# Patient Record
Sex: FEMALE | Race: WHITE | NOT HISPANIC OR LATINO | Employment: FULL TIME | URBAN - NONMETROPOLITAN AREA
[De-identification: names, ages, dates, MRNs, and addresses within clinical notes are randomized per-mention and may not be internally consistent; named-entity substitution may affect disease eponyms.]

---

## 2017-01-06 RX ORDER — NICOTINE 21 MG/24HR
1 PATCH, TRANSDERMAL 24 HOURS TRANSDERMAL EVERY 24 HOURS
Qty: 28 PATCH | Refills: 3 | Status: SHIPPED | OUTPATIENT
Start: 2017-01-06 | End: 2017-04-26

## 2017-02-20 ENCOUNTER — OFFICE VISIT (OUTPATIENT)
Dept: ORTHOPEDIC SURGERY | Facility: CLINIC | Age: 32
End: 2017-02-20

## 2017-02-20 VITALS — BODY MASS INDEX: 26.66 KG/M2 | HEIGHT: 65 IN | WEIGHT: 160 LBS

## 2017-02-20 DIAGNOSIS — M12.20 PVNS (PIGMENTED VILLONODULAR SYNOVITIS): ICD-10-CM

## 2017-02-20 DIAGNOSIS — M25.462 KNEE EFFUSION, LEFT: ICD-10-CM

## 2017-02-20 DIAGNOSIS — G89.29 CHRONIC PAIN OF LEFT KNEE: Primary | ICD-10-CM

## 2017-02-20 DIAGNOSIS — M25.562 CHRONIC PAIN OF LEFT KNEE: Primary | ICD-10-CM

## 2017-02-20 PROCEDURE — 99213 OFFICE O/P EST LOW 20 MIN: CPT | Performed by: ORTHOPAEDIC SURGERY

## 2017-02-20 NOTE — PROGRESS NOTES
Tiara Verde is a 31 y.o. female returns for left knee pain isn't had arthroscopic knee surgery in June of last 16 patient had a synovitis of the knee's some adhesions in the suprapatellar pouch large plica some synovitis which I diagnosis pigmented villonodular synovitis but was not by pathology but she said for the last months she's had pain and limited motion but no swelling or effusion physical examination shows a limited protective range of motion but no effusion no bogginess is synovium etc.     Chief Complaint   Patient presents with   • Left Knee - Follow-up       HISTORY OF PRESENT ILLNESS: Patient had a scope 6/7/16 and now has pain and swelling.        CONCURRENT MEDICAL HISTORY:    Past Medical History   Diagnosis Date   • Depressive disorder    • Joint pain      Pain in unspecified joint      • Knee pain    • Malaise      Other malaise   • Mastodynia    • Nicotine dependence    • Pelvic and perineal pain    • Thumb pain      Pain in thumb    • Tick bite    • Vaginal discharge        Allergies   Allergen Reactions   • Azithromycin          Current Outpatient Prescriptions:   •  escitalopram (LEXAPRO) 20 MG tablet, Take 1 tablet by mouth every night. anxiety/depression, Disp: 30 tablet, Rfl: 11  •  nicotine (EQL NICOTINE) 21 MG/24HR patch, Place 1 patch on the skin Daily., Disp: 28 patch, Rfl: 3  •  norgestimate-ethinyl estradiol (ORTHO TRI-CYCLEN, 28,) 0.18/0.215/0.25 MG-35 MCG per tablet, Take 1 tablet by mouth daily., Disp: 28 tablet, Rfl: 11  •  amoxicillin (AMOXIL) 500 MG capsule, Take 2 capsules by mouth 2 (Two) Times a Day., Disp: 40 capsule, Rfl: 0    Past Surgical History   Procedure Laterality Date   • Colonoscopy  08/30/2012     Colonoscopy, diagnostic (screening) 20133 (1)    • Procedure generic converted  09/15/2006     CONZ OF CERVIX W/SCOPE LEEP 21546 (1)      • Endoscopy  08/30/2012     EGD w/ tube 27181 (1)      • Knee arthroscopy  03/08/2016     Knee arthroscopy, surgery (1)     "Arthroscopy with removal of loose bodies and pathological plica.    • Knee surgery  06/07/2016     Debridement. Synovectomy. Plica excision. Insertion of drains.   • Pap smear  11/15/2010   • Procedure generic converted  10/03/2012     Salpingo-oophorectomy (1)      • Tubal abdominal ligation  10/27/2010   • Vaginal delivery  10/2010       ROS  No fevers or chills.  No chest pain or shortness of air.  No GI or  disturbances.    PHYSICAL EXAMINATION:       Visit Vitals   • Ht 65\" (165.1 cm)   • Wt 160 lb (72.6 kg)   • BMI 26.63 kg/m2       Physical Exam    GAIT:     []  Normal  []  Antalgic    Assistive device: []  None  []  Walker     []  Crutches  []  Cane     []  Wheelchair  []  Stretcher    Ortho Exam the upper note she has limited range of motion she has passive range of motion is full but she is protective as far as pain is concerned  No results found.          ASSESSMENT: Repeat synovitis pigmented villonodular    Diagnoses and all orders for this visit:    Chronic pain of left knee    Knee effusion, left          PLAN MRI of the knee No Follow-up on file.    Ilan Klein MD  "

## 2017-02-27 ENCOUNTER — HOSPITAL ENCOUNTER (OUTPATIENT)
Dept: MRI IMAGING | Facility: HOSPITAL | Age: 32
Discharge: HOME | End: 2017-02-27
Admitting: ORTHOPAEDIC SURGERY

## 2017-02-27 DIAGNOSIS — M25.562 CHRONIC PAIN OF LEFT KNEE: ICD-10-CM

## 2017-02-27 DIAGNOSIS — M12.20 PVNS (PIGMENTED VILLONODULAR SYNOVITIS): ICD-10-CM

## 2017-02-27 DIAGNOSIS — G89.29 CHRONIC PAIN OF LEFT KNEE: ICD-10-CM

## 2017-02-27 PROCEDURE — 73721 MRI JNT OF LWR EXTRE W/O DYE: CPT

## 2017-03-01 ENCOUNTER — OFFICE VISIT (OUTPATIENT)
Dept: ORTHOPEDIC SURGERY | Facility: CLINIC | Age: 32
End: 2017-03-01

## 2017-03-01 VITALS — HEIGHT: 65 IN | BODY MASS INDEX: 28.16 KG/M2 | WEIGHT: 169 LBS

## 2017-03-01 DIAGNOSIS — G89.29 CHRONIC PAIN OF LEFT KNEE: ICD-10-CM

## 2017-03-01 DIAGNOSIS — M22.42 CHONDROMALACIA OF PATELLA, LEFT: ICD-10-CM

## 2017-03-01 DIAGNOSIS — M25.462 KNEE EFFUSION, LEFT: Primary | ICD-10-CM

## 2017-03-01 DIAGNOSIS — M25.562 CHRONIC PAIN OF LEFT KNEE: ICD-10-CM

## 2017-03-01 DIAGNOSIS — M12.20 PVNS (PIGMENTED VILLONODULAR SYNOVITIS): ICD-10-CM

## 2017-03-01 PROBLEM — M22.40 CHONDROMALACIA OF PATELLA: Status: ACTIVE | Noted: 2017-03-01

## 2017-03-01 PROCEDURE — 20610 DRAIN/INJ JOINT/BURSA W/O US: CPT | Performed by: ORTHOPAEDIC SURGERY

## 2017-03-01 PROCEDURE — 99212 OFFICE O/P EST SF 10 MIN: CPT | Performed by: ORTHOPAEDIC SURGERY

## 2017-03-01 RX ORDER — TRIAMCINOLONE ACETONIDE 40 MG/ML
80 INJECTION, SUSPENSION INTRA-ARTICULAR; INTRAMUSCULAR
Status: COMPLETED | OUTPATIENT
Start: 2017-03-01 | End: 2017-03-01

## 2017-03-01 RX ADMIN — TRIAMCINOLONE ACETONIDE 80 MG: 40 INJECTION, SUSPENSION INTRA-ARTICULAR; INTRAMUSCULAR at 08:40

## 2017-03-01 NOTE — PROGRESS NOTES
Tiara Verde is a 31 y.o. female returns for MRI results     Chief Complaint   Patient presents with   • Left Knee - Follow-up     Here today for mri results       HISTORY OF PRESENT ILLNESS:       CONCURRENT MEDICAL HISTORY:    Past Medical History   Diagnosis Date   • Depressive disorder    • Joint pain      Pain in unspecified joint      • Knee pain    • Malaise      Other malaise   • Mastodynia    • Nicotine dependence    • Pelvic and perineal pain    • Thumb pain      Pain in thumb    • Tick bite    • Vaginal discharge        Allergies   Allergen Reactions   • Azithromycin          Current Outpatient Prescriptions:   •  amoxicillin (AMOXIL) 500 MG capsule, Take 2 capsules by mouth 2 (Two) Times a Day., Disp: 40 capsule, Rfl: 0  •  escitalopram (LEXAPRO) 20 MG tablet, Take 1 tablet by mouth every night. anxiety/depression, Disp: 30 tablet, Rfl: 11  •  nicotine (EQL NICOTINE) 21 MG/24HR patch, Place 1 patch on the skin Daily., Disp: 28 patch, Rfl: 3  •  norgestimate-ethinyl estradiol (ORTHO TRI-CYCLEN, 28,) 0.18/0.215/0.25 MG-35 MCG per tablet, Take 1 tablet by mouth daily., Disp: 28 tablet, Rfl: 11    Past Surgical History   Procedure Laterality Date   • Colonoscopy  08/30/2012     Colonoscopy, diagnostic (screening) 82060 (1)    • Procedure generic converted  09/15/2006     CONZ OF CERVIX W/SCOPE LEEP 55785 (1)      • Endoscopy  08/30/2012     EGD w/ tube 59190 (1)      • Knee arthroscopy  03/08/2016     Knee arthroscopy, surgery (1)    Arthroscopy with removal of loose bodies and pathological plica.    • Knee surgery  06/07/2016     Debridement. Synovectomy. Plica excision. Insertion of drains.   • Pap smear  11/15/2010   • Procedure generic converted  10/03/2012     Salpingo-oophorectomy (1)      • Tubal abdominal ligation  10/27/2010   • Vaginal delivery  10/2010       ROS  No fevers or chills.  No chest pain or shortness of air.  No GI or  disturbances.    PHYSICAL EXAMINATION:       Visit  "Vitals   • Ht 65\" (165.1 cm)   • Wt 169 lb (76.7 kg)   • BMI 28.12 kg/m2       Physical Exam    GAIT:     []  Normal  []  Antalgic    Assistive device: []  None  []  Walker     []  Crutches  []  Cane     []  Wheelchair  []  Stretcher    Ortho Exam      Mri Knee Left Without Contrast    Result Date: 2/27/2017  Narrative: Patient Name:  MRS. VINICIO JAMIL Patient ID:  [id number] Ordering:  JV TATUM Referring:  JV TATUM ------------------------------------------------ DATE OF EXAM: 2/27/2017 12:41 PM CST PROCEDURE: MR KNEE WITHOUT IV CONTRAST INDICATION FOR PROCEDURE: 31 years old patient presents for evaluation of chronic knee pain and PVNS. ICD 10 codes:  M25.562, G89.29 and M12.20. TECHNIQUE:  Multiplanar multisequence MR images of the left knee are submitted for interpretation. COMPARISON:  None. FINDINGS:  The retropatellar cartilage has decreased thickness. Medial and lateral retinacula have a grossly normal appearance. There is a small amount joint fluid. The quadriceps and patellar tendons have normal curvilinear signal void. Anterior and posterior cruciate ligaments have a grossly normal appearance. Lateral meniscus has normal morphology and signal characteristics. There are grade 2 signal changes within the medial meniscus without definite tear. The iliotibial band, biceps femoris tendon, medial and lateral collateral ligaments have a normal MR appearance. Imaged distal femur, proximal tibia and fibula have a normal appearance and alignment. Visualized skeletal muscles have a grossly normal appearance.     Impression: 1.  Mild chondromalacia patella. 2.  Mild chondromalacia of the medial meniscus. 3.  Small joint effusion. Electronically signed by:  Winifred Blue MD  2/27/2017 5:39 PM CST Workstation: [facility]      Large Joint Arthrocentesis  Date/Time: 3/1/2017 8:40 AM  Consent given by: patient  Site marked: site marked  Timeout: Immediately prior to procedure a time out was called " to verify the correct patient, procedure, equipment, support staff and site/side marked as required   Supporting Documentation  Indications: pain   Procedure Details  Location: knee -   Preparation: Patient was prepped and draped in the usual sterile fashion  Needle size: 22 G  Medications administered: 80 mg triamcinolone acetonide 40 MG/ML  Patient tolerance: patient tolerated the procedure well with no immediate complications       MRI results were given to the patient she no significant pathology that is going to require surgery there are degenerative changes as expected intercourse the meniscus changes from the previous surgery does have chondromalacia patella is no gross synovitis today or effusion W or cortisone shot and see how she does and discussion the treatment of this problem her diagnosis is chondromalacia patella.03/01/17 at 8:56 AM by Ilan Klein MD          ASSESSMENT:    Diagnoses and all orders for this visit:    Knee effusion, left  -     Large Joint Arthrocentesis    Chronic pain of left knee  -     Large Joint Arthrocentesis    PVNS (pigmented villonodular synovitis)  -     Large Joint Arthrocentesis        PLAN    No Follow-up on file.    Ilan Klein MD

## 2017-04-26 ENCOUNTER — OFFICE VISIT (OUTPATIENT)
Dept: ORTHOPEDIC SURGERY | Facility: CLINIC | Age: 32
End: 2017-04-26

## 2017-04-26 VITALS — BODY MASS INDEX: 28.32 KG/M2 | HEIGHT: 65 IN | WEIGHT: 170 LBS

## 2017-04-26 DIAGNOSIS — M65.9 SYNOVITIS OF ANKLE: ICD-10-CM

## 2017-04-26 DIAGNOSIS — M25.571 ACUTE RIGHT ANKLE PAIN: Primary | ICD-10-CM

## 2017-04-26 PROCEDURE — 99213 OFFICE O/P EST LOW 20 MIN: CPT | Performed by: ORTHOPAEDIC SURGERY

## 2017-04-26 NOTE — PROGRESS NOTES
Tiara Verde is a 31 y.o. female pain in the right ankle  Primary provider:  CHRIS Temple       Chief Complaint   Patient presents with   • Right Ankle - Pain     Xray done today in the office       HISTORY OF PRESENT ILLNESS:    Pain   This is a new problem. Episode onset: 3 weeks ago, jsut began with no injury. The problem occurs constantly. The problem has been gradually worsening. Associated symptoms include joint swelling. Associated symptoms comments: Sharp pain, giving, weakness, swelling. The symptoms are aggravated by walking and standing. She has tried nothing for the symptoms.      She states that by the end of the day the ankles are markedly swollen  CONCURRENT MEDICAL HISTORY:    Past Medical History:   Diagnosis Date   • Depressive disorder    • Joint pain     Pain in unspecified joint      • Knee pain    • Malaise     Other malaise   • Mastodynia    • Nicotine dependence    • Pelvic and perineal pain    • Thumb pain     Pain in thumb    • Tick bite    • Vaginal discharge        Allergies   Allergen Reactions   • Azithromycin        No current outpatient prescriptions on file.    Past Surgical History:   Procedure Laterality Date   • COLONOSCOPY  08/30/2012    Colonoscopy, diagnostic (screening) 43469 (1)    • ENDOSCOPY  08/30/2012    EGD w/ tube 59974 (1)      • KNEE ARTHROSCOPY  03/08/2016    Knee arthroscopy, surgery (1)    Arthroscopy with removal of loose bodies and pathological plica.    • KNEE SURGERY  06/07/2016    Debridement. Synovectomy. Plica excision. Insertion of drains.   • PAP SMEAR  11/15/2010   • PROCEDURE GENERIC CONVERTED  09/15/2006    CONZ OF CERVIX W/SCOPE LEEP 50846 (1)      • PROCEDURE GENERIC CONVERTED  10/03/2012    Salpingo-oophorectomy (1)      • TUBAL ABDOMINAL LIGATION  10/27/2010   • VAGINAL DELIVERY  10/2010       Family History   Problem Relation Age of Onset   • Other Mother    • Other Sister    • Hyperlipidemia Other    • Hypertension Other    •  "Diabetes Other        Social History     Social History   • Marital status:      Spouse name: N/A   • Number of children: N/A   • Years of education: N/A     Occupational History   • Not on file.     Social History Main Topics   • Smoking status: Current Every Day Smoker   • Smokeless tobacco: Not on file   • Alcohol use No   • Drug use: No   • Sexual activity: Yes     Partners: Male     Birth control/ protection: Other     Other Topics Concern   • Not on file     Social History Narrative        Review of Systems   Constitutional: Negative.    HENT: Negative.    Eyes: Negative.    Respiratory: Negative.    Cardiovascular: Negative.    Gastrointestinal: Negative.    Endocrine: Negative.    Genitourinary: Negative.    Musculoskeletal: Positive for joint swelling.   Skin: Negative.    Allergic/Immunologic: Negative.    Neurological: Negative.    Hematological: Negative.    Psychiatric/Behavioral: Negative.        PHYSICAL EXAMINATION:       Ht 65\" (165.1 cm)  Wt 170 lb (77.1 kg)  BMI 28.29 kg/m2    Physical Exam    GAIT:     []  Normal  [x]  Antalgic    Assistive device: [x]  None  []  Walker     []  Crutches  []  Cane     []  Wheelchair  []  Stretcher    Ortho Exam examination of both feet and ankles shows slight increase and bimalleolar circumference on the right side markedly tender over the anterior aspect of the ankle at the fibular side and generalized tenderness along the ankle area did not detect any significant fluid within the ankle joint is a full range of motion but to her is markedly painful just in fact he can't even touch the fibular anterior fibular ankle region because of just complaints of pain is there is no increased heat or warmth neuro circulatory status is normal x-rays are normal plan is an MRI I think this individual is going to come with an acute synovitis problem I think she is going to come in the future demonstrates some type of arthritides          No results " found.        ASSESSMENT:    Diagnoses and all orders for this visit:    Acute right ankle pain          PLAN    No Follow-up on file.    Ilan Klein MD

## 2017-09-20 ENCOUNTER — APPOINTMENT (OUTPATIENT)
Dept: LAB | Facility: HOSPITAL | Age: 32
End: 2017-09-20

## 2017-09-20 ENCOUNTER — OFFICE VISIT (OUTPATIENT)
Dept: ORTHOPEDIC SURGERY | Facility: CLINIC | Age: 32
End: 2017-09-20

## 2017-09-20 ENCOUNTER — OFFICE VISIT (OUTPATIENT)
Dept: FAMILY MEDICINE CLINIC | Facility: CLINIC | Age: 32
End: 2017-09-20

## 2017-09-20 VITALS — BODY MASS INDEX: 27.99 KG/M2 | WEIGHT: 168 LBS | HEIGHT: 65 IN

## 2017-09-20 VITALS
BODY MASS INDEX: 28.16 KG/M2 | HEIGHT: 65 IN | DIASTOLIC BLOOD PRESSURE: 80 MMHG | SYSTOLIC BLOOD PRESSURE: 120 MMHG | WEIGHT: 169 LBS

## 2017-09-20 DIAGNOSIS — Z12.4 SCREENING FOR CERVICAL CANCER: Primary | ICD-10-CM

## 2017-09-20 DIAGNOSIS — F41.9 ANXIETY: ICD-10-CM

## 2017-09-20 DIAGNOSIS — M25.562 CHRONIC PAIN OF LEFT KNEE: Primary | ICD-10-CM

## 2017-09-20 DIAGNOSIS — G89.29 CHRONIC PAIN OF LEFT KNEE: Primary | ICD-10-CM

## 2017-09-20 DIAGNOSIS — M22.42 CHONDROMALACIA OF PATELLA, LEFT: ICD-10-CM

## 2017-09-20 DIAGNOSIS — Z00.00 GENERAL MEDICAL EXAM: ICD-10-CM

## 2017-09-20 DIAGNOSIS — F32.A DEPRESSIVE DISORDER: ICD-10-CM

## 2017-09-20 DIAGNOSIS — N76.0 ACUTE VAGINITIS: ICD-10-CM

## 2017-09-20 DIAGNOSIS — R53.81 MALAISE: ICD-10-CM

## 2017-09-20 LAB
CANDIDA ALBICANS: NEGATIVE
GARDNERELLA VAGINALIS: POSITIVE
TRICHOMONAS VAGINALIS PCR: NEGATIVE

## 2017-09-20 PROCEDURE — 99213 OFFICE O/P EST LOW 20 MIN: CPT | Performed by: ORTHOPAEDIC SURGERY

## 2017-09-20 PROCEDURE — 99214 OFFICE O/P EST MOD 30 MIN: CPT | Performed by: NURSE PRACTITIONER

## 2017-09-20 PROCEDURE — 87510 GARDNER VAG DNA DIR PROBE: CPT | Performed by: NURSE PRACTITIONER

## 2017-09-20 PROCEDURE — 88142 CYTOPATH C/V THIN LAYER: CPT | Performed by: NURSE PRACTITIONER

## 2017-09-20 PROCEDURE — 87480 CANDIDA DNA DIR PROBE: CPT | Performed by: NURSE PRACTITIONER

## 2017-09-20 PROCEDURE — 87660 TRICHOMONAS VAGIN DIR PROBE: CPT | Performed by: NURSE PRACTITIONER

## 2017-09-20 RX ORDER — ESCITALOPRAM OXALATE 20 MG/1
TABLET ORAL
COMMUNITY
Start: 2017-09-11 | End: 2017-09-20

## 2017-09-20 RX ORDER — METRONIDAZOLE 500 MG/1
500 TABLET ORAL 2 TIMES DAILY
Qty: 14 TABLET | Refills: 0 | Status: SHIPPED | OUTPATIENT
Start: 2017-09-20 | End: 2018-03-05

## 2017-09-20 RX ORDER — DESVENLAFAXINE SUCCINATE 50 MG/1
50 TABLET, EXTENDED RELEASE ORAL DAILY
Qty: 30 TABLET | Refills: 11 | Status: ON HOLD | OUTPATIENT
Start: 2017-09-20 | End: 2018-03-23

## 2017-09-20 NOTE — PROGRESS NOTES
Chief Complaint   Patient presents with   • Depression     med refill Pap?     Subjective   Tiara Verde is a 32 y.o. female.     HPI Comments: Patient presents with increase in anxiety and fatigue-has tried prozac, wellbutrin, lexapro and buspar-all made her feel more anxious and overwhelmed     Depression   Visit Type: follow-up  Patient presents with the following symptoms: decreased concentration, depressed mood, irritability, malaise and nervousness/anxiety.  Patient is not experiencing: anhedonia, chest pain, choking sensation, compulsions, confusion, dizziness, dry mouth, excessive worry, fatigue, memory impairment, nausea, obsessions, palpitations, shortness of breath, suicidal ideas, weight gain and weight loss.  Frequency of symptoms: most days   Severity: severe   Nighttime awakenings: none  Compliance with medications:  %        Anxiety   Presents for follow-up visit. Symptoms include decreased concentration, depressed mood, irritability, malaise and nervous/anxious behavior. Patient reports no chest pain, compulsions, confusion, dizziness, dry mouth, excessive worry, feeling of choking, nausea, obsessions, palpitations, shortness of breath or suicidal ideas. Symptoms occur occasionally (meds helps ). The severity of symptoms is mild. The quality of sleep is fair. Nighttime awakenings: none.     Her past medical history is significant for depression. Compliance with medications is %. Side effects of treatment include joint pain and limb pain (pos ra factor seeing rheumatology next month ).   Vaginitis   This is a recurrent problem. The current episode started 1 to 4 weeks ago. The problem occurs constantly. The problem has been gradually worsening. Associated symptoms include arthralgias, joint swelling and myalgias. Pertinent negatives include no abdominal pain, anorexia, chest pain, chills, coughing, diaphoresis, fatigue, fever, headaches, nausea, neck pain, numbness, rash, sore  throat, swollen glands, urinary symptoms, vertigo, visual change, vomiting or weakness. Nothing aggravates the symptoms.        The following portions of the patient's history were reviewed and updated as appropriate: allergies, current medications, past social history and problem list.    Review of Systems   Constitutional: Positive for irritability. Negative for activity change, appetite change, chills, diaphoresis, fatigue, fever, unexpected weight change, weight gain and weight loss.   HENT: Negative.  Negative for sore throat.    Eyes: Negative.  Negative for photophobia, pain, discharge, redness, itching and visual disturbance.   Respiratory: Negative.  Negative for apnea, cough, choking, chest tightness, shortness of breath, wheezing and stridor.    Cardiovascular: Negative.  Negative for chest pain, palpitations and leg swelling.   Gastrointestinal: Negative.  Negative for abdominal distention, abdominal pain, anorexia, nausea and vomiting.   Endocrine: Negative.  Negative for cold intolerance, heat intolerance, polydipsia, polyphagia and polyuria.   Genitourinary: Positive for vaginal discharge. Negative for decreased urine volume, difficulty urinating, dyspareunia, dysuria, enuresis, flank pain, frequency, genital sores, hematuria, menstrual problem, pelvic pain, urgency, vaginal bleeding and vaginal pain.   Musculoskeletal: Positive for arthralgias, back pain, gait problem, joint swelling and myalgias. Negative for neck pain and neck stiffness.   Skin: Negative.  Negative for color change and rash.   Allergic/Immunologic: Negative.  Negative for environmental allergies, food allergies and immunocompromised state.   Neurological: Negative for dizziness, vertigo, tremors, seizures, syncope, facial asymmetry, speech difficulty, weakness, light-headedness, numbness and headaches.   Hematological: Negative.  Negative for adenopathy. Does not bruise/bleed easily.   Psychiatric/Behavioral: Positive for decreased  "concentration and sleep disturbance. Negative for agitation, behavioral problems, confusion, dysphoric mood, hallucinations and suicidal ideas. The patient is nervous/anxious. The patient is not hyperactive.    All other systems reviewed and are negative.      Objective   /80  Ht 65\" (165.1 cm)  Wt 169 lb (76.7 kg)  BMI 28.12 kg/m2  Physical Exam   Constitutional: She is oriented to person, place, and time. She appears well-developed and well-nourished.   HENT:   Head: Normocephalic and atraumatic.   Mouth/Throat: No oropharyngeal exudate.   Eyes: EOM are normal. Pupils are equal, round, and reactive to light. Right eye exhibits no discharge. Left eye exhibits no discharge. No scleral icterus.   Neck: Normal range of motion. Neck supple. No JVD present. No tracheal deviation present. No thyromegaly present.   Cardiovascular: Normal rate, regular rhythm, normal heart sounds and normal pulses.  Exam reveals no gallop and no friction rub.    No murmur heard.  Pulmonary/Chest: Effort normal and breath sounds normal. No stridor. No respiratory distress. She has no wheezes. She has no rales. She exhibits no tenderness.   Abdominal: Soft. Bowel sounds are normal. She exhibits no distension and no mass. There is no tenderness. There is no rebound and no guarding. No hernia. Hernia confirmed negative in the right inguinal area and confirmed negative in the left inguinal area.   Genitourinary: Rectum normal. Rectal exam shows guaiac negative stool. Pelvic exam was performed with patient supine. No labial fusion. There is no rash, tenderness, lesion or injury on the right labia. There is no rash, tenderness, lesion or injury on the left labia. Uterus is not deviated, not enlarged, not fixed and not tender. Cervix exhibits no motion tenderness, no discharge and no friability. Right adnexum displays no mass, no tenderness and no fullness. Left adnexum displays no mass, no tenderness and no fullness. No erythema, " tenderness or bleeding in the vagina. No foreign body in the vagina. No signs of injury around the vagina. Vaginal discharge found.   Musculoskeletal: Normal range of motion. She exhibits no edema, tenderness or deformity.        Right shoulder: She exhibits swelling and pain. She exhibits no spasm.        Right knee: She exhibits bony tenderness.        Left knee: She exhibits bony tenderness.   Lymphadenopathy:     She has no cervical adenopathy.        Right: No inguinal adenopathy present.        Left: No inguinal adenopathy present.   Neurological: She is alert and oriented to person, place, and time. She has normal reflexes. She displays normal reflexes. No cranial nerve deficit. She exhibits normal muscle tone. Coordination normal.   Skin: Skin is warm and dry. No rash noted. No erythema. No pallor.   Psychiatric: She has a normal mood and affect.   Nursing note and vitals reviewed.      Assessment/Plan   Problem List Items Addressed This Visit        Genitourinary    Acute vaginitis    Relevant Orders    Vaginitis / Vaginosis DNA Probe       Other    Depressive disorder    Relevant Medications    desvenlafaxine (PRISTIQ) 50 MG 24 hr tablet    Screening for cervical cancer - Primary    Relevant Orders    Cytology, thin prep pap (cervical)    General medical exam    Relevant Orders    CBC & Differential    Comprehensive Metabolic Panel    Iron    Lipid Panel    Vitamin B12    Vitamin D 25 Hydroxy    TSH    T3    T4, Free    Magnesium    Malaise    Relevant Orders    CBC & Differential    Comprehensive Metabolic Panel    Iron    Lipid Panel    Vitamin B12    Vitamin D 25 Hydroxy    TSH    T3    T4, Free    Magnesium    Anxiety           New Medications Ordered This Visit   Medications   • desvenlafaxine (PRISTIQ) 50 MG 24 hr tablet     Sig: Take 1 tablet by mouth Daily.     Dispense:  30 tablet     Refill:  11        patient has tried has failed on several meds-lexapro, prozac, wellbutrin, buspar-will trial on  pristiq     It's not just what you eat, but when you eat  Eat breakfast, and eat smaller meals throughout the day. A healthy breakfast can jumpstart your metabolism, while eating small, healthy meals (rather than the standard three large meals) keeps your energy up.   Avoid eating at night. Try to eat dinner earlier and fast for 14-16 hours until breakfast the next morning. Studies suggest that eating only when you’re most active and giving your digestive system a long break each day may help to regulate weight.

## 2017-09-20 NOTE — PROGRESS NOTES
"Tiara Verde is a 32 y.o. female returns for     Chief Complaint   Patient presents with   • Left Knee - Follow-up       HISTORY OF PRESENT ILLNESS: steroid injection done on 3/1/2017 lasted about 2 days. Patient has a constant ache. Pain in the left knee patient's had both knees scoped.  The left one was the last instance done well than her ankle started to bother her then it just got better on its own we had ordered an MRI but that was never done.  The left knee now is bothering her since she can hardly bend it cannot walk on it she's limping   CONCURRENT MEDICAL HISTORY:    Past Medical History:   Diagnosis Date   • Depressive disorder    • Joint pain     Pain in unspecified joint      • Knee pain    • Malaise     Other malaise   • Mastodynia    • Nicotine dependence    • Pelvic and perineal pain    • Thumb pain     Pain in thumb    • Tick bite    • Vaginal discharge        Allergies   Allergen Reactions   • Azithromycin          Current Outpatient Prescriptions:   •  escitalopram (LEXAPRO) 20 MG tablet, , Disp: , Rfl:     Past Surgical History:   Procedure Laterality Date   • COLONOSCOPY  08/30/2012    Colonoscopy, diagnostic (screening) 44430 (1)    • ENDOSCOPY  08/30/2012    EGD w/ tube 52377 (1)      • KNEE ARTHROSCOPY  03/08/2016    Knee arthroscopy, surgery (1)    Arthroscopy with removal of loose bodies and pathological plica.    • KNEE SURGERY  06/07/2016    Debridement. Synovectomy. Plica excision. Insertion of drains.   • PAP SMEAR  11/15/2010   • PROCEDURE GENERIC CONVERTED  09/15/2006    CONZ OF CERVIX W/SCOPE LEEP 48330 (1)      • PROCEDURE GENERIC CONVERTED  10/03/2012    Salpingo-oophorectomy (1)      • TUBAL ABDOMINAL LIGATION  10/27/2010   • VAGINAL DELIVERY  10/2010       ROS  No fevers or chills.  No chest pain or shortness of air.  No GI or  disturbances.    PHYSICAL EXAMINATION:       Ht 65\" (165.1 cm)  Wt 168 lb (76.2 kg)  BMI 27.96 kg/m2    Physical Exam    GAIT:     []  " Normal  [x]  Antalgic    Assistive device: [x]  None  []  Walker     []  Crutches  []  Cane     []  Wheelchair  []  Stretcher    Ortho Exam physical exam of the left knee limited range of motion actively no crepitation noted no effusion is not hot or warm generalized tenderness and then the knee not specific impression is chondromalacia  No results found.          ASSESSMENT:    Diagnoses and all orders for this visit:    Chronic pain of left knee    Chondromalacia of patella, left    Other orders  -     escitalopram (LEXAPRO) 20 MG tablet;           PLAN    No Follow-up on file.    Ilan Klein MD

## 2017-09-22 ENCOUNTER — TELEPHONE (OUTPATIENT)
Dept: FAMILY MEDICINE CLINIC | Facility: CLINIC | Age: 32
End: 2017-09-22

## 2017-09-22 LAB
LAB AP CASE REPORT: NORMAL
LAB AP GYN ADDITIONAL INFORMATION: NORMAL
LAB AP GYN OTHER FINDINGS: NORMAL
Lab: NORMAL
PATH INTERP SPEC-IMP: NORMAL
STAT OF ADQ CVX/VAG CYTO-IMP: NORMAL

## 2017-10-16 RX ORDER — NAPROXEN 375 MG/1
375 TABLET ORAL 2 TIMES DAILY PRN
Qty: 60 TABLET | Refills: 5 | Status: SHIPPED | OUTPATIENT
Start: 2017-10-16 | End: 2019-04-22

## 2017-10-16 RX ORDER — CETIRIZINE HYDROCHLORIDE 10 MG/1
10 TABLET ORAL DAILY
Qty: 30 TABLET | Refills: 5 | Status: SHIPPED | OUTPATIENT
Start: 2017-10-16 | End: 2018-04-20 | Stop reason: SDUPTHER

## 2017-10-25 ENCOUNTER — CLINICAL SUPPORT (OUTPATIENT)
Dept: ORTHOPEDIC SURGERY | Facility: CLINIC | Age: 32
End: 2017-10-25

## 2017-10-25 VITALS — BODY MASS INDEX: 27.99 KG/M2 | WEIGHT: 168 LBS | HEIGHT: 65 IN

## 2017-10-25 DIAGNOSIS — M25.562 CHRONIC PAIN OF LEFT KNEE: Primary | ICD-10-CM

## 2017-10-25 DIAGNOSIS — G89.29 CHRONIC PAIN OF LEFT KNEE: Primary | ICD-10-CM

## 2017-10-25 PROCEDURE — 20610 DRAIN/INJ JOINT/BURSA W/O US: CPT | Performed by: ORTHOPAEDIC SURGERY

## 2017-10-25 NOTE — PROGRESS NOTES
"Knee Joint Injection      Patient: Tiara Verde    YOB: 1985    Chief Complaint   Patient presents with   • Left Knee - Follow-up   • Injections     Orthovisc #1       History of Present Illness:  Patient is here for an injection.  No new complaints.    Physical Exam: 32 y.o. female  General Appearance:    Alert, cooperative, in no acute distress                   Vitals:    10/25/17 0822   Weight: 168 lb (76.2 kg)   Height: 65\" (165.1 cm)        Patient is alert and oriented, ×3 no acute distress.  Affect is normal respiratory rate is normal unlabored.  Exam and complaints are unchanged.    Procedure:  Large Joint Arthrocentesis  Date/Time: 10/25/2017 8:22 AM  Consent given by: patient  Site marked: site marked  Timeout: Immediately prior to procedure a time out was called to verify the correct patient, procedure, equipment, support staff and site/side marked as required   Supporting Documentation  Indications: pain   Procedure Details  Location: knee - L knee  Needle size: 22 G  Approach: lateral  Medications administered: 30 mg Hyaluronan 30 MG/2ML  Patient tolerance: patient tolerated the procedure well with no immediate complications      No fluid obtained    Assessment:    Diagnoses and all orders for this visit:    Chronic pain of left knee  -     Large Joint Arthrocentesis        Plan:   Slowly increase activity as tolerated.  Discussed importance of leg strengthening and general conditioning.  Discussed warning signs of injection.  Discussed that purpose of injections was symptom improvement and improved activity.  Also discussed that further treatment options depended on symptoms at followup and length of time of improvement after injections.    No Follow-up on file.    Ilan Klein MD        "

## 2017-11-01 ENCOUNTER — CLINICAL SUPPORT (OUTPATIENT)
Dept: ORTHOPEDIC SURGERY | Facility: CLINIC | Age: 32
End: 2017-11-01

## 2017-11-01 VITALS — HEIGHT: 65 IN

## 2017-11-01 DIAGNOSIS — M25.562 CHRONIC PAIN OF LEFT KNEE: Primary | ICD-10-CM

## 2017-11-01 DIAGNOSIS — G89.29 CHRONIC PAIN OF LEFT KNEE: Primary | ICD-10-CM

## 2017-11-01 DIAGNOSIS — M22.42 CHONDROMALACIA OF PATELLA, LEFT: ICD-10-CM

## 2017-11-01 PROCEDURE — 20610 DRAIN/INJ JOINT/BURSA W/O US: CPT | Performed by: ORTHOPAEDIC SURGERY

## 2017-11-01 NOTE — PROGRESS NOTES
"Knee Joint Injection      Patient: Tiara Verde    YOB: 1985    Chief Complaint   Patient presents with   • Left Knee - Follow-up   • Injections     orthovisc injection #2       History of Present Illness:  Patient is here for an injection.  No new complaints.    Physical Exam: 32 y.o. female  General Appearance:    Alert, cooperative, in no acute distress                   Vitals:    11/01/17 0806   Height: 65\" (165.1 cm)        Patient is alert and oriented, ×3 no acute distress.  Affect is normal respiratory rate is normal unlabored.  Exam and complaints are unchanged.    Procedure:  Large Joint Arthrocentesis  Date/Time: 11/1/2017 8:06 AM  Consent given by: patient  Site marked: site marked  Timeout: Immediately prior to procedure a time out was called to verify the correct patient, procedure, equipment, support staff and site/side marked as required   Supporting Documentation  Indications: pain   Procedure Details  Location: knee - L knee  Needle size: 22 G  Approach: lateral  Medications administered: 30 mg Hyaluronan 30 MG/2ML  Patient tolerance: patient tolerated the procedure well with no immediate complications      There is no effusion in the knee joint    Assessment:    Diagnoses and all orders for this visit:    Chronic pain of left knee  -     Large Joint Arthrocentesis    Chondromalacia of patella, left  -     Large Joint Arthrocentesis        Plan:   Slowly increase activity as tolerated.  Discussed importance of leg strengthening and general conditioning.  Discussed warning signs of injection.  Discussed that purpose of injections was symptom improvement and improved activity.  Also discussed that further treatment options depended on symptoms at followup and length of time of improvement after injections.    No Follow-up on file.    Ilan Klein MD        "

## 2017-11-08 ENCOUNTER — CLINICAL SUPPORT (OUTPATIENT)
Dept: ORTHOPEDIC SURGERY | Facility: CLINIC | Age: 32
End: 2017-11-08

## 2017-11-08 VITALS — WEIGHT: 168 LBS | BODY MASS INDEX: 27.99 KG/M2 | HEIGHT: 65 IN

## 2017-11-08 DIAGNOSIS — M25.562 CHRONIC PAIN OF LEFT KNEE: ICD-10-CM

## 2017-11-08 DIAGNOSIS — G89.29 CHRONIC PAIN OF LEFT KNEE: ICD-10-CM

## 2017-11-08 DIAGNOSIS — M22.42 CHONDROMALACIA OF PATELLA, LEFT: Primary | ICD-10-CM

## 2017-11-08 PROCEDURE — 20610 DRAIN/INJ JOINT/BURSA W/O US: CPT | Performed by: NURSE PRACTITIONER

## 2017-11-08 NOTE — PROGRESS NOTES
"Knee Joint Injection      Patient: Tiara Verde    YOB: 1985    Chief Complaint   Patient presents with   • Left Knee - Follow-up   • Injections     orthovisc#3        History of Present Illness:  Patient is here for an injection.  No new complaints.    Physical Exam: 32 y.o. female  General Appearance:    Alert, cooperative, in no acute distress                   Vitals:    11/08/17 0842   Weight: 168 lb (76.2 kg)   Height: 65\" (165.1 cm)        Patient is alert and oriented, ×3 no acute distress.  Affect is normal respiratory rate is normal unlabored.  Exam and complaints are unchanged.    Procedure:  Large Joint Arthrocentesis  Date/Time: 11/8/2017 8:43 AM  Consent given by: patient  Site marked: site marked  Timeout: Immediately prior to procedure a time out was called to verify the correct patient, procedure, equipment, support staff and site/side marked as required   Supporting Documentation  Indications: pain   Procedure Details  Location: knee - L knee  Needle size: 22 G  Approach: lateral  Medications administered: 30 mg Hyaluronan 30 MG/2ML  Patient tolerance: patient tolerated the procedure well with no immediate complications        Assessment:    Diagnoses and all orders for this visit:    Chondromalacia of patella, left  -     Large Joint Arthrocentesis    Chronic pain of left knee  -     Large Joint Arthrocentesis      Plan:   Slowly increase activity as tolerated.  Discussed importance of leg strengthening and general conditioning.  Discussed warning signs of injection.  Discussed that purpose of injections was symptom improvement and improved activity.  Also discussed that further treatment options depended on symptoms at followup and length of time of improvement after injections.    Return in about 1 week (around 11/15/2017), or for Orthovisc #4.    Adry Loving, APRN        "

## 2017-11-15 ENCOUNTER — CLINICAL SUPPORT (OUTPATIENT)
Dept: ORTHOPEDIC SURGERY | Facility: CLINIC | Age: 32
End: 2017-11-15

## 2017-11-15 DIAGNOSIS — M25.562 CHRONIC PAIN OF LEFT KNEE: ICD-10-CM

## 2017-11-15 DIAGNOSIS — G89.29 CHRONIC PAIN OF LEFT KNEE: ICD-10-CM

## 2017-11-15 DIAGNOSIS — M22.42 CHONDROMALACIA OF PATELLA, LEFT: Primary | ICD-10-CM

## 2017-11-15 PROCEDURE — 20610 DRAIN/INJ JOINT/BURSA W/O US: CPT | Performed by: ORTHOPAEDIC SURGERY

## 2017-11-15 NOTE — PROGRESS NOTES
Knee Joint Injection      Patient: Tiara Verde  Patient states the injections have not been effective in giving her fourth one October 1 very poor treatment for a chondromalacia knee x-rays are excellent I feel no indication for surgery I will see her back in 6 months or if her knee develops an effusion she'll be able to come in quicker contact us probably will need aspiration and injection of cortisone  YOB: 1985    Chief Complaint   Patient presents with   • Left Knee - Follow-up   • Injections     orthovisc injection #4.        History of Present Illness:  Patient is here for an injection.  No new complaints.    Physical Exam: 32 y.o. female  General Appearance:    Alert, cooperative, in no acute distress                   There were no vitals filed for this visit.     Patient is alert and oriented, ×3 no acute distress.  Affect is normal respiratory rate is normal unlabored.  Exam and complaints are unchanged.    Procedure:  Large Joint Arthrocentesis  Date/Time: 11/15/2017 8:12 AM  Consent given by: patient  Site marked: site marked  Timeout: Immediately prior to procedure a time out was called to verify the correct patient, procedure, equipment, support staff and site/side marked as required   Supporting Documentation  Indications: pain   Procedure Details  Location: knee - L knee  Needle size: 22 G  Approach: lateral  Medications administered: 30 mg Hyaluronan 30 MG/2ML  Patient tolerance: patient tolerated the procedure well with no immediate complications          Assessment:    There are no diagnoses linked to this encounter.    Plan:   Slowly increase activity as tolerated.  Discussed importance of leg strengthening and general conditioning.  Discussed warning signs of injection.  Discussed that purpose of injections was symptom improvement and improved activity.  Also discussed that further treatment options depended on symptoms at followup and length of time of improvement after  injections.    No Follow-up on file.    11/15/17 at 8:58 AM by Ilan Klein MD

## 2018-03-05 ENCOUNTER — OFFICE VISIT (OUTPATIENT)
Dept: ORTHOPEDIC SURGERY | Facility: CLINIC | Age: 33
End: 2018-03-05

## 2018-03-05 VITALS — BODY MASS INDEX: 27.48 KG/M2 | WEIGHT: 171 LBS | HEIGHT: 66 IN

## 2018-03-05 DIAGNOSIS — G89.29 CHRONIC PAIN OF LEFT KNEE: Primary | ICD-10-CM

## 2018-03-05 DIAGNOSIS — M22.42 CHONDROMALACIA OF PATELLA, LEFT: ICD-10-CM

## 2018-03-05 DIAGNOSIS — M25.562 CHRONIC PAIN OF LEFT KNEE: Primary | ICD-10-CM

## 2018-03-05 PROCEDURE — 99213 OFFICE O/P EST LOW 20 MIN: CPT | Performed by: ORTHOPAEDIC SURGERY

## 2018-03-05 NOTE — PROGRESS NOTES
Tiara Verde is a 32 y.o. female returns for     Chief Complaint   Patient presents with   • Left Knee - Follow-up, Pain       HISTORY OF PRESENT ILLNESS: Patient is here today for left knee pain. Patient has had chronic left knee pain for approximately 2 years. Patient has constant pain with the left knee. Patient has tried multiple injections as far as steroids and orthovisc injections. Patient was sent to [facility] upon arrival. The right knee had previous arthroscopy in 2016 and did well.  The left knee had a similar procedure in June 2016 and has never been well.  She has follow-up MRI scan in February 2017 which showed mild chondromalacia patella and degenerative changes to medial meniscus.  After this she had steroid injections as well as Orthovisc as above.  She continues to have debilitating pain in the left knee and inability to use it.       CONCURRENT MEDICAL HISTORY:    Past Medical History:   Diagnosis Date   • Depressive disorder    • Joint pain     Pain in unspecified joint      • Knee pain    • Malaise     Other malaise   • Mastodynia    • Nicotine dependence    • Pelvic and perineal pain    • Thumb pain     Pain in thumb    • Tick bite    • Vaginal discharge        Allergies   Allergen Reactions   • Azithromycin          Current Outpatient Prescriptions:   •  cetirizine (zyrTEC) 10 MG tablet, Take 1 tablet by mouth Daily., Disp: 30 tablet, Rfl: 5  •  desvenlafaxine (PRISTIQ) 50 MG 24 hr tablet, Take 1 tablet by mouth Daily., Disp: 30 tablet, Rfl: 11  •  naproxen (NAPROSYN) 375 MG tablet, Take 1 tablet by mouth 2 (Two) Times a Day As Needed for Mild Pain ., Disp: 60 tablet, Rfl: 5    Past Surgical History:   Procedure Laterality Date   • COLONOSCOPY  08/30/2012    Colonoscopy, diagnostic (screening) 82857 (1)    • ENDOSCOPY  08/30/2012    EGD w/ tube 16845 (1)      • KNEE ARTHROSCOPY  03/08/2016    Knee arthroscopy, surgery (1)    Arthroscopy with removal of loose bodies and pathological plica.  "   • KNEE SURGERY  06/07/2016    Debridement. Synovectomy. Plica excision. Insertion of drains.   • PAP SMEAR  11/15/2010   • PROCEDURE GENERIC CONVERTED  09/15/2006    CONZ OF CERVIX W/SCOPE LEEP 37859 (1)      • PROCEDURE GENERIC CONVERTED  10/03/2012    Salpingo-oophorectomy (1)      • TUBAL ABDOMINAL LIGATION  10/27/2010   • VAGINAL DELIVERY  10/2010       ROS  No fevers or chills.  No chest pain or shortness of air.  No GI or  disturbances.Positive for joint swelling, weakness, loss of motion left knee        PHYSICAL EXAMINATION:       Ht 167.6 cm (66\")  Wt 77.6 kg (171 lb)  BMI 27.6 kg/m2    Physical Exam   Constitutional: She is oriented to person, place, and time. She appears well-developed and well-nourished.   HENT:   Head: Normocephalic and atraumatic.   Eyes: EOM are normal. Pupils are equal, round, and reactive to light.   Neck: Normal range of motion. Neck supple.   Pulmonary/Chest: Effort normal.   Neurological: She is alert and oriented to person, place, and time.   Skin: Skin is warm and dry.   Psychiatric: She has a normal mood and affect. Thought content normal.       GAIT:     []  Normal  [x]  Antalgic    Assistive device: [x]  None  []  Walker     []  Crutches  []  Cane     []  Wheelchair  []  Stretcher    Left Knee Exam     Tenderness   The patient is experiencing tenderness in the lateral joint line, patella and medial joint line.    Range of Motion   Extension:  -10 abnormal   Flexion:  90 abnormal     Other   Erythema: absent  Scars: absent  Sensation: normal  Pulse: present  Swelling: none    Comments:  Motion of the hip is nonpainful.  No tenderness posterior to the knee.  Very little raising is negative she is neurovascularly intact distally with good capillary refill.                 DATE OF EXAM: 2/27/2017 12:41 PM CST     PROCEDURE: MR KNEE WITHOUT IV CONTRAST     INDICATION FOR PROCEDURE: 31 years old patient presents for  evaluation of chronic knee pain and PVNS. ICD 10 codes:  " M25.562,  G89.29 and M12.20.     TECHNIQUE:  Multiplanar multisequence MR images of the left knee  are submitted for interpretation.     COMPARISON:  None.     FINDINGS:  The retropatellar cartilage has decreased thickness.  Medial and lateral retinacula have a grossly normal appearance.  There is a small amount joint fluid. The quadriceps and patellar  tendons have normal curvilinear signal void. Anterior and  posterior cruciate ligaments have a grossly normal appearance.      Lateral meniscus has normal morphology and signal  characteristics. There are grade 2 signal changes within the  medial meniscus without definite tear.      The iliotibial band, biceps femoris tendon, medial and lateral  collateral ligaments have a normal MR appearance.     Imaged distal femur, proximal tibia and fibula have a normal  appearance and alignment. Visualized skeletal muscles have a  grossly normal appearance.     IMPRESSION:     1.  Mild chondromalacia patella.   2.  Mild chondromalacia of the medial meniscus.  3.  Small joint effusion.     Electronically signed by:  Winifred Blue MD  2/27/2017 5:39 PM [facility]  Workstation: [email or web address]        ASSESSMENT:    Diagnoses and all orders for this visit:    Chronic pain of left knee  -     XR Knee 1 or 2 View Left  -     MRI knee left  wo contrast; Future    Chondromalacia of patella, left  -     MRI knee left  wo contrast; Future          PLAN her process has continued to deteriorate over the year and is really not responded to any of the modalities prescribed above including surgical intervention.  It is been a year since her knee has been looked into actually quite over that and proceed with repeat MRI scan a few week see anything objective.  We'll see her back after this and try to develop further treatment plan at that point.    No Follow-up on file.    Guanaco Brady MD

## 2018-03-13 ENCOUNTER — HOSPITAL ENCOUNTER (OUTPATIENT)
Dept: MRI IMAGING | Facility: HOSPITAL | Age: 33
Discharge: HOME | End: 2018-03-13
Admitting: ORTHOPAEDIC SURGERY

## 2018-03-13 DIAGNOSIS — M22.42 CHONDROMALACIA OF PATELLA, LEFT: ICD-10-CM

## 2018-03-13 DIAGNOSIS — G89.29 CHRONIC PAIN OF LEFT KNEE: ICD-10-CM

## 2018-03-13 DIAGNOSIS — M25.562 CHRONIC PAIN OF LEFT KNEE: ICD-10-CM

## 2018-03-13 PROCEDURE — 73721 MRI JNT OF LWR EXTRE W/O DYE: CPT

## 2018-03-15 ENCOUNTER — OFFICE VISIT (OUTPATIENT)
Dept: ORTHOPEDIC SURGERY | Facility: CLINIC | Age: 33
End: 2018-03-15

## 2018-03-15 VITALS — WEIGHT: 173 LBS | BODY MASS INDEX: 27.8 KG/M2 | HEIGHT: 66 IN

## 2018-03-15 DIAGNOSIS — M22.42 CHONDROMALACIA OF PATELLA, LEFT: Primary | ICD-10-CM

## 2018-03-15 DIAGNOSIS — M25.562 ACUTE PAIN OF LEFT KNEE: ICD-10-CM

## 2018-03-15 PROCEDURE — 20610 DRAIN/INJ JOINT/BURSA W/O US: CPT | Performed by: ORTHOPAEDIC SURGERY

## 2018-03-15 PROCEDURE — 99214 OFFICE O/P EST MOD 30 MIN: CPT | Performed by: ORTHOPAEDIC SURGERY

## 2018-03-15 RX ORDER — LIDOCAINE HYDROCHLORIDE 10 MG/ML
8 INJECTION, SOLUTION INFILTRATION; PERINEURAL
Status: COMPLETED | OUTPATIENT
Start: 2018-03-15 | End: 2018-03-15

## 2018-03-15 RX ADMIN — LIDOCAINE HYDROCHLORIDE 8 ML: 10 INJECTION, SOLUTION INFILTRATION; PERINEURAL at 11:10

## 2018-03-15 NOTE — PROGRESS NOTES
Tiara Verde is a 32 y.o. female returns for     Chief Complaint   Patient presents with   • Left Knee - Follow-up       HISTORY OF PRESENT ILLNESS: Patient is here to discuss recent findings of MRI- left knee. Patient denies any change since her last office visit. I have reviewed her MRI scan at this point and really no significant changes negative except for chondromalacia patella.  Some mild subtle edema at the distal pole of the patella is noted.  Anterior notes I noted that she had a positive rheumatoid factor a few years ago.  She was worked up by rheumatologist who did a lot of blood work according to her history.  She was told at that time that she did not have rheumatoid arthritis.  The right side is doing well after arthroscopy left side is still bothersome.       CONCURRENT MEDICAL HISTORY:    Past Medical History:   Diagnosis Date   • Depressive disorder    • Joint pain     Pain in unspecified joint      • Knee pain    • Malaise     Other malaise   • Mastodynia    • Nicotine dependence    • Pelvic and perineal pain    • Thumb pain     Pain in thumb    • Tick bite    • Vaginal discharge        Allergies   Allergen Reactions   • Azithromycin          Current Outpatient Prescriptions:   •  cetirizine (zyrTEC) 10 MG tablet, Take 1 tablet by mouth Daily., Disp: 30 tablet, Rfl: 5  •  desvenlafaxine (PRISTIQ) 50 MG 24 hr tablet, Take 1 tablet by mouth Daily., Disp: 30 tablet, Rfl: 11  •  naproxen (NAPROSYN) 375 MG tablet, Take 1 tablet by mouth 2 (Two) Times a Day As Needed for Mild Pain ., Disp: 60 tablet, Rfl: 5    Past Surgical History:   Procedure Laterality Date   • COLONOSCOPY  08/30/2012    Colonoscopy, diagnostic (screening) 85490 (1)    • ENDOSCOPY  08/30/2012    EGD w/ tube 39570 (1)      • KNEE ARTHROSCOPY  03/08/2016    Knee arthroscopy, surgery (1)    Arthroscopy with removal of loose bodies and pathological plica.    • KNEE SURGERY  06/07/2016    Debridement. Synovectomy. Plica excision.  "Insertion of drains.   • PAP SMEAR  11/15/2010   • PROCEDURE GENERIC CONVERTED  09/15/2006    CONZ OF CERVIX W/SCOPE LEEP 11787 (1)      • PROCEDURE GENERIC CONVERTED  10/03/2012    Salpingo-oophorectomy (1)      • TUBAL ABDOMINAL LIGATION  10/27/2010   • VAGINAL DELIVERY  10/2010       ROS  No fevers or chills.  No chest pain or shortness of air.  No GI or  disturbances. No other joints bother her.  Remainder review of systems reviewed by me and are negative.    PHYSICAL EXAMINATION:       Ht 167.6 cm (66\")   Wt 78.5 kg (173 lb)   BMI 27.92 kg/m²     Physical Exam   Constitutional: She is oriented to person, place, and time. She appears well-developed and well-nourished.   HENT:   Head: Normocephalic and atraumatic.   Eyes: EOM are normal. Pupils are equal, round, and reactive to light.   Neck: Neck supple.   Pulmonary/Chest: Effort normal.   Musculoskeletal: Normal range of motion.   Neurological: She is alert and oriented to person, place, and time.   Skin: Skin is warm and dry. Capillary refill takes less than 2 seconds.   Psychiatric: She has a normal mood and affect. Thought content normal.   Vitals reviewed.      GAIT:     [x]  Normal  []  Antalgic    Assistive device: [x]  None  []  Walker     []  Crutches  []  Cane     []  Wheelchair  []  Stretcher    Ortho Exam  She has good motion of the hip.  No pain to internal and external rotation.  The right knee moves well no effusion no pain no patellofemoral crepitus.  Left knee she does not want to extend after about 10° she is tender about the patella no obvious crepitus mild swelling no real effusion.  Ligaments are stable Is negative she is neurovascularly intact distally.    Xr Knee 1 Or 2 View Left    Result Date: 3/5/2018  Narrative: Lateral view left knee Comparisons none Single lateral view shows well mineralized bone patellofemoral joint appears intact no appreciable effusion Impression: Negative lateral view     Xr Knee Bilateral Ap " Standing    Result Date: 3/5/2018  Narrative: AP standing view bilateral knees Comparisons none Weightbearing film shows fairly symmetric space bilaterally.  Bony abnormality seen.  Subtle narrowing of medial joint line of left Impression: Mild medial compartment arthritis left knee greater than right    Mri Knee Left  Wo Contrast    Result Date: 3/13/2018  Narrative: MRI left knee without contrast. HISTORY: Pain, instability. Chondromalacia patella. Prior exam: Left knee March 5, 2018. TECHNIQUE: Multiplanar multisequence noncontrast images right knee. FINDINGS: Normal patellar articular hyaline cartilage. Subtle bone edema inferior aspect of patella. This may represent contusion or sequela of traction type injury. No fractures observed. Normal medial collateral ligament and lateral collateral ligament complex. Normal medial and lateral menisci. Normal anterior and posterior cruciate ligaments.     Impression: CONCLUSION: Subtle bone edema inferior aspect of patella of uncertain significance. Normal patellar articular hyaline cartilage. MRI left knee is otherwise unremarkable. Electronically signed by:  Danish Garcia MD  3/13/2018 1:32 PM CDT Workstation: [email or web address]    Large Joint Arthrocentesis  Date/Time: 3/15/2018 11:10 AM  Consent given by: patient  Site marked: site marked  Timeout: Immediately prior to procedure a time out was called to verify the correct patient, procedure, equipment, support staff and site/side marked as required   Supporting Documentation  Indications: diagnostic evaluation   Procedure Details  Location: knee -   Needle size: 22 G  Approach: anterolateral  Medications administered: 8 mL lidocaine 1 %  Patient tolerance: patient tolerated the procedure well with no immediate complications              ASSESSMENT:    Diagnoses and all orders for this visit:    Chondromalacia of patella, left    Acute pain of left knee          PLAN I injected her knee with Xylocaine from a diagnostic standpoint.   She has pretty much complete relief of her left knee pain.  Is bothering her a daily basis affecting her work even hurts at rest.  I discussed with her probably 20-30 minutes today regarding options here.  Not unreasonable to type pain she is having to consider diagnostic arthroscopy as I believe the problem is intra-articular based upon her picture after injection.  I explained very clearly that this is a diagnostic in nature and may or may not be able to find out the source of her pain.  Risks remained stable resolve her pain, need for further surgery, infection, blood clot, neurovascular injury, medical anesthetic complications, etc. she understands and will clearly verbalized this in detail and informed consent is given we will proceed as planned    No Follow-up on file.    Guanaco Brady MD

## 2018-03-22 ENCOUNTER — ANESTHESIA EVENT (OUTPATIENT)
Dept: PERIOP | Facility: HOSPITAL | Age: 33
End: 2018-03-22

## 2018-03-23 ENCOUNTER — HOSPITAL ENCOUNTER (OUTPATIENT)
Facility: HOSPITAL | Age: 33
Setting detail: HOSPITAL OUTPATIENT SURGERY
Discharge: HOME | End: 2018-03-23
Attending: ORTHOPAEDIC SURGERY | Admitting: ORTHOPAEDIC SURGERY

## 2018-03-23 ENCOUNTER — ANESTHESIA (OUTPATIENT)
Dept: PERIOP | Facility: HOSPITAL | Age: 33
End: 2018-03-23

## 2018-03-23 VITALS
SYSTOLIC BLOOD PRESSURE: 120 MMHG | TEMPERATURE: 98 F | OXYGEN SATURATION: 98 % | RESPIRATION RATE: 20 BRPM | BODY MASS INDEX: 29.05 KG/M2 | WEIGHT: 174.38 LBS | HEIGHT: 65 IN | DIASTOLIC BLOOD PRESSURE: 70 MMHG | HEART RATE: 80 BPM

## 2018-03-23 DIAGNOSIS — M22.42 CHONDROMALACIA OF PATELLA, LEFT: ICD-10-CM

## 2018-03-23 PROCEDURE — 25010000002 ONDANSETRON PER 1 MG: Performed by: NURSE ANESTHETIST, CERTIFIED REGISTERED

## 2018-03-23 PROCEDURE — 25010000002 FENTANYL CITRATE (PF) 100 MCG/2ML SOLUTION: Performed by: NURSE ANESTHETIST, CERTIFIED REGISTERED

## 2018-03-23 PROCEDURE — 29877 ARTHRS KNEE SURG DBRDMT/SHVG: CPT | Performed by: ORTHOPAEDIC SURGERY

## 2018-03-23 PROCEDURE — 25010000003 CEFAZOLIN PER 500 MG: Performed by: ORTHOPAEDIC SURGERY

## 2018-03-23 PROCEDURE — 25010000002 PROPOFOL 10 MG/ML EMULSION: Performed by: NURSE ANESTHETIST, CERTIFIED REGISTERED

## 2018-03-23 PROCEDURE — 25010000002 DEXAMETHASONE PER 1 MG: Performed by: NURSE ANESTHETIST, CERTIFIED REGISTERED

## 2018-03-23 PROCEDURE — 25010000002 MIDAZOLAM PER 1 MG: Performed by: NURSE ANESTHETIST, CERTIFIED REGISTERED

## 2018-03-23 RX ORDER — DEXAMETHASONE SODIUM PHOSPHATE 4 MG/ML
INJECTION, SOLUTION INTRA-ARTICULAR; INTRALESIONAL; INTRAMUSCULAR; INTRAVENOUS; SOFT TISSUE AS NEEDED
Status: DISCONTINUED | OUTPATIENT
Start: 2018-03-23 | End: 2018-03-23 | Stop reason: SURG

## 2018-03-23 RX ORDER — NALOXONE HCL 0.4 MG/ML
0.2 VIAL (ML) INJECTION AS NEEDED
Status: DISCONTINUED | OUTPATIENT
Start: 2018-03-23 | End: 2018-03-23 | Stop reason: HOSPADM

## 2018-03-23 RX ORDER — DESVENLAFAXINE SUCCINATE 50 MG/1
50 TABLET, EXTENDED RELEASE ORAL NIGHTLY
COMMUNITY
End: 2018-08-27

## 2018-03-23 RX ORDER — EPHEDRINE SULFATE 50 MG/ML
5 INJECTION, SOLUTION INTRAVENOUS ONCE AS NEEDED
Status: DISCONTINUED | OUTPATIENT
Start: 2018-03-23 | End: 2018-03-23 | Stop reason: HOSPADM

## 2018-03-23 RX ORDER — MEPERIDINE HYDROCHLORIDE 50 MG/ML
12.5 INJECTION INTRAMUSCULAR; INTRAVENOUS; SUBCUTANEOUS
Status: DISCONTINUED | OUTPATIENT
Start: 2018-03-23 | End: 2018-03-23 | Stop reason: HOSPADM

## 2018-03-23 RX ORDER — LABETALOL HYDROCHLORIDE 5 MG/ML
5 INJECTION, SOLUTION INTRAVENOUS
Status: DISCONTINUED | OUTPATIENT
Start: 2018-03-23 | End: 2018-03-23 | Stop reason: HOSPADM

## 2018-03-23 RX ORDER — LIDOCAINE HYDROCHLORIDE 20 MG/ML
INJECTION, SOLUTION INFILTRATION; PERINEURAL AS NEEDED
Status: DISCONTINUED | OUTPATIENT
Start: 2018-03-23 | End: 2018-03-23 | Stop reason: SURG

## 2018-03-23 RX ORDER — MIDAZOLAM HYDROCHLORIDE 1 MG/ML
INJECTION INTRAMUSCULAR; INTRAVENOUS AS NEEDED
Status: DISCONTINUED | OUTPATIENT
Start: 2018-03-23 | End: 2018-03-23 | Stop reason: SURG

## 2018-03-23 RX ORDER — HYDROCODONE BITARTRATE AND ACETAMINOPHEN 7.5; 325 MG/1; MG/1
1-2 TABLET ORAL EVERY 4 HOURS PRN
Qty: 30 TABLET | Refills: 0 | Status: SHIPPED | OUTPATIENT
Start: 2018-03-23 | End: 2018-08-27

## 2018-03-23 RX ORDER — SODIUM CHLORIDE, SODIUM GLUCONATE, SODIUM ACETATE, POTASSIUM CHLORIDE, AND MAGNESIUM CHLORIDE 526; 502; 368; 37; 30 MG/100ML; MG/100ML; MG/100ML; MG/100ML; MG/100ML
1000 INJECTION, SOLUTION INTRAVENOUS CONTINUOUS PRN
Status: DISCONTINUED | OUTPATIENT
Start: 2018-03-23 | End: 2018-03-23 | Stop reason: HOSPADM

## 2018-03-23 RX ORDER — ONDANSETRON 2 MG/ML
INJECTION INTRAMUSCULAR; INTRAVENOUS AS NEEDED
Status: DISCONTINUED | OUTPATIENT
Start: 2018-03-23 | End: 2018-03-23 | Stop reason: SURG

## 2018-03-23 RX ORDER — HYDROCODONE BITARTRATE AND ACETAMINOPHEN 5; 325 MG/1; MG/1
1 TABLET ORAL ONCE AS NEEDED
Status: DISCONTINUED | OUTPATIENT
Start: 2018-03-23 | End: 2018-03-23 | Stop reason: HOSPADM

## 2018-03-23 RX ORDER — ONDANSETRON 2 MG/ML
4 INJECTION INTRAMUSCULAR; INTRAVENOUS ONCE AS NEEDED
Status: DISCONTINUED | OUTPATIENT
Start: 2018-03-23 | End: 2018-03-23 | Stop reason: HOSPADM

## 2018-03-23 RX ORDER — FLUMAZENIL 0.1 MG/ML
0.2 INJECTION INTRAVENOUS AS NEEDED
Status: DISCONTINUED | OUTPATIENT
Start: 2018-03-23 | End: 2018-03-23 | Stop reason: HOSPADM

## 2018-03-23 RX ORDER — ACETAMINOPHEN 325 MG/1
650 TABLET ORAL ONCE AS NEEDED
Status: DISCONTINUED | OUTPATIENT
Start: 2018-03-23 | End: 2018-03-23 | Stop reason: HOSPADM

## 2018-03-23 RX ORDER — FENTANYL CITRATE 50 UG/ML
INJECTION, SOLUTION INTRAMUSCULAR; INTRAVENOUS AS NEEDED
Status: DISCONTINUED | OUTPATIENT
Start: 2018-03-23 | End: 2018-03-23 | Stop reason: SURG

## 2018-03-23 RX ORDER — ACETAMINOPHEN 650 MG/1
650 SUPPOSITORY RECTAL ONCE AS NEEDED
Status: DISCONTINUED | OUTPATIENT
Start: 2018-03-23 | End: 2018-03-23 | Stop reason: HOSPADM

## 2018-03-23 RX ORDER — DIPHENHYDRAMINE HYDROCHLORIDE 50 MG/ML
12.5 INJECTION INTRAMUSCULAR; INTRAVENOUS
Status: DISCONTINUED | OUTPATIENT
Start: 2018-03-23 | End: 2018-03-23 | Stop reason: HOSPADM

## 2018-03-23 RX ORDER — PROPOFOL 10 MG/ML
VIAL (ML) INTRAVENOUS AS NEEDED
Status: DISCONTINUED | OUTPATIENT
Start: 2018-03-23 | End: 2018-03-23 | Stop reason: SURG

## 2018-03-23 RX ADMIN — MEPERIDINE HYDROCHLORIDE 12.5 MG: 50 INJECTION INTRAMUSCULAR; INTRAVENOUS; SUBCUTANEOUS at 09:29

## 2018-03-23 RX ADMIN — FENTANYL CITRATE 25 MCG: 50 INJECTION, SOLUTION INTRAMUSCULAR; INTRAVENOUS at 08:35

## 2018-03-23 RX ADMIN — MEPERIDINE HYDROCHLORIDE 12.5 MG: 50 INJECTION INTRAMUSCULAR; INTRAVENOUS; SUBCUTANEOUS at 09:24

## 2018-03-23 RX ADMIN — MEPERIDINE HYDROCHLORIDE 12.5 MG: 50 INJECTION INTRAMUSCULAR; INTRAVENOUS; SUBCUTANEOUS at 09:19

## 2018-03-23 RX ADMIN — DEXAMETHASONE SODIUM PHOSPHATE 4 MG: 4 INJECTION, SOLUTION INTRAMUSCULAR; INTRAVENOUS at 08:20

## 2018-03-23 RX ADMIN — SODIUM CHLORIDE, SODIUM GLUCONATE, SODIUM ACETATE, POTASSIUM CHLORIDE, AND MAGNESIUM CHLORIDE: 526; 502; 368; 37; 30 INJECTION, SOLUTION INTRAVENOUS at 07:40

## 2018-03-23 RX ADMIN — HYDROMORPHONE HYDROCHLORIDE 0.5 MG: 10 INJECTION INTRAMUSCULAR; INTRAVENOUS; SUBCUTANEOUS at 09:34

## 2018-03-23 RX ADMIN — ONDANSETRON 4 MG: 2 INJECTION INTRAMUSCULAR; INTRAVENOUS at 08:35

## 2018-03-23 RX ADMIN — FENTANYL CITRATE 25 MCG: 50 INJECTION, SOLUTION INTRAMUSCULAR; INTRAVENOUS at 08:20

## 2018-03-23 RX ADMIN — MIDAZOLAM 2 MG: 1 INJECTION INTRAMUSCULAR; INTRAVENOUS at 07:40

## 2018-03-23 RX ADMIN — MEPERIDINE HYDROCHLORIDE 12.5 MG: 50 INJECTION INTRAMUSCULAR; INTRAVENOUS; SUBCUTANEOUS at 09:14

## 2018-03-23 RX ADMIN — PROPOFOL 50 MG: 10 INJECTION, EMULSION INTRAVENOUS at 07:57

## 2018-03-23 RX ADMIN — FENTANYL CITRATE 25 MCG: 50 INJECTION, SOLUTION INTRAMUSCULAR; INTRAVENOUS at 07:57

## 2018-03-23 RX ADMIN — FENTANYL CITRATE 25 MCG: 50 INJECTION, SOLUTION INTRAMUSCULAR; INTRAVENOUS at 08:05

## 2018-03-23 RX ADMIN — PROPOFOL 150 MG: 10 INJECTION, EMULSION INTRAVENOUS at 07:50

## 2018-03-23 RX ADMIN — HYDROCODONE BITARTRATE AND ACETAMINOPHEN 1 TABLET: 5; 325 TABLET ORAL at 10:35

## 2018-03-23 RX ADMIN — HYDROMORPHONE HYDROCHLORIDE 0.5 MG: 10 INJECTION INTRAMUSCULAR; INTRAVENOUS; SUBCUTANEOUS at 09:44

## 2018-03-23 RX ADMIN — SODIUM CHLORIDE, SODIUM GLUCONATE, SODIUM ACETATE, POTASSIUM CHLORIDE, AND MAGNESIUM CHLORIDE 1000 ML: 526; 502; 368; 37; 30 INJECTION, SOLUTION INTRAVENOUS at 06:11

## 2018-03-23 RX ADMIN — CEFAZOLIN SODIUM 2 G: 1 INJECTION, POWDER, FOR SOLUTION INTRAMUSCULAR; INTRAVENOUS at 08:00

## 2018-03-23 RX ADMIN — LIDOCAINE HYDROCHLORIDE 60 MG: 20 INJECTION, SOLUTION INFILTRATION; PERINEURAL at 07:50

## 2018-03-23 NOTE — ANESTHESIA PROCEDURE NOTES
Airway  Urgency: elective    Airway not difficult    General Information and Staff    Patient location during procedure: OR    Indications and Patient Condition  Indications for airway management: airway protection    Preoxygenated: yes  MILS maintained throughout  Mask difficulty assessment: 0 - not attempted    Final Airway Details  Final airway type: supraglottic airway      Successful airway: I-gel  Size 4    Number of attempts at approach: 1

## 2018-03-23 NOTE — H&P (VIEW-ONLY)
Tiara Verde is a 32 y.o. female returns for     Chief Complaint   Patient presents with   • Left Knee - Follow-up       HISTORY OF PRESENT ILLNESS: Patient is here to discuss recent findings of MRI- left knee. Patient denies any change since her last office visit. I have reviewed her MRI scan at this point and really no significant changes negative except for chondromalacia patella.  Some mild subtle edema at the distal pole of the patella is noted.  Anterior notes I noted that she had a positive rheumatoid factor a few years ago.  She was worked up by rheumatologist who did a lot of blood work according to her history.  She was told at that time that she did not have rheumatoid arthritis.  The right side is doing well after arthroscopy left side is still bothersome.       CONCURRENT MEDICAL HISTORY:    Past Medical History:   Diagnosis Date   • Depressive disorder    • Joint pain     Pain in unspecified joint      • Knee pain    • Malaise     Other malaise   • Mastodynia    • Nicotine dependence    • Pelvic and perineal pain    • Thumb pain     Pain in thumb    • Tick bite    • Vaginal discharge        Allergies   Allergen Reactions   • Azithromycin          Current Outpatient Prescriptions:   •  cetirizine (zyrTEC) 10 MG tablet, Take 1 tablet by mouth Daily., Disp: 30 tablet, Rfl: 5  •  desvenlafaxine (PRISTIQ) 50 MG 24 hr tablet, Take 1 tablet by mouth Daily., Disp: 30 tablet, Rfl: 11  •  naproxen (NAPROSYN) 375 MG tablet, Take 1 tablet by mouth 2 (Two) Times a Day As Needed for Mild Pain ., Disp: 60 tablet, Rfl: 5    Past Surgical History:   Procedure Laterality Date   • COLONOSCOPY  08/30/2012    Colonoscopy, diagnostic (screening) 20802 (1)    • ENDOSCOPY  08/30/2012    EGD w/ tube 74088 (1)      • KNEE ARTHROSCOPY  03/08/2016    Knee arthroscopy, surgery (1)    Arthroscopy with removal of loose bodies and pathological plica.    • KNEE SURGERY  06/07/2016    Debridement. Synovectomy. Plica excision.  "Insertion of drains.   • PAP SMEAR  11/15/2010   • PROCEDURE GENERIC CONVERTED  09/15/2006    CONZ OF CERVIX W/SCOPE LEEP 94057 (1)      • PROCEDURE GENERIC CONVERTED  10/03/2012    Salpingo-oophorectomy (1)      • TUBAL ABDOMINAL LIGATION  10/27/2010   • VAGINAL DELIVERY  10/2010       ROS  No fevers or chills.  No chest pain or shortness of air.  No GI or  disturbances. No other joints bother her.  Remainder review of systems reviewed by me and are negative.    PHYSICAL EXAMINATION:       Ht 167.6 cm (66\")   Wt 78.5 kg (173 lb)   BMI 27.92 kg/m²     Physical Exam   Constitutional: She is oriented to person, place, and time. She appears well-developed and well-nourished.   HENT:   Head: Normocephalic and atraumatic.   Eyes: EOM are normal. Pupils are equal, round, and reactive to light.   Neck: Neck supple.   Pulmonary/Chest: Effort normal.   Musculoskeletal: Normal range of motion.   Neurological: She is alert and oriented to person, place, and time.   Skin: Skin is warm and dry. Capillary refill takes less than 2 seconds.   Psychiatric: She has a normal mood and affect. Thought content normal.   Vitals reviewed.      GAIT:     [x]  Normal  []  Antalgic    Assistive device: [x]  None  []  Walker     []  Crutches  []  Cane     []  Wheelchair  []  Stretcher    Ortho Exam  She has good motion of the hip.  No pain to internal and external rotation.  The right knee moves well no effusion no pain no patellofemoral crepitus.  Left knee she does not want to extend after about 10° she is tender about the patella no obvious crepitus mild swelling no real effusion.  Ligaments are stable Is negative she is neurovascularly intact distally.    Xr Knee 1 Or 2 View Left    Result Date: 3/5/2018  Narrative: Lateral view left knee Comparisons none Single lateral view shows well mineralized bone patellofemoral joint appears intact no appreciable effusion Impression: Negative lateral view     Xr Knee Bilateral Ap " Standing    Result Date: 3/5/2018  Narrative: AP standing view bilateral knees Comparisons none Weightbearing film shows fairly symmetric space bilaterally.  Bony abnormality seen.  Subtle narrowing of medial joint line of left Impression: Mild medial compartment arthritis left knee greater than right    Mri Knee Left  Wo Contrast    Result Date: 3/13/2018  Narrative: MRI left knee without contrast. HISTORY: Pain, instability. Chondromalacia patella. Prior exam: Left knee March 5, 2018. TECHNIQUE: Multiplanar multisequence noncontrast images right knee. FINDINGS: Normal patellar articular hyaline cartilage. Subtle bone edema inferior aspect of patella. This may represent contusion or sequela of traction type injury. No fractures observed. Normal medial collateral ligament and lateral collateral ligament complex. Normal medial and lateral menisci. Normal anterior and posterior cruciate ligaments.     Impression: CONCLUSION: Subtle bone edema inferior aspect of patella of uncertain significance. Normal patellar articular hyaline cartilage. MRI left knee is otherwise unremarkable. Electronically signed by:  Danish Garcia MD  3/13/2018 1:32 PM CDT Workstation: [id number]    Large Joint Arthrocentesis  Date/Time: 3/15/2018 11:10 AM  Consent given by: patient  Site marked: site marked  Timeout: Immediately prior to procedure a time out was called to verify the correct patient, procedure, equipment, support staff and site/side marked as required   Supporting Documentation  Indications: diagnostic evaluation   Procedure Details  Location: knee -   Needle size: 22 G  Approach: anterolateral  Medications administered: 8 mL lidocaine 1 %  Patient tolerance: patient tolerated the procedure well with no immediate complications              ASSESSMENT:    Diagnoses and all orders for this visit:    Chondromalacia of patella, left    Acute pain of left knee          PLAN I injected her knee with Xylocaine from a diagnostic standpoint.   She has pretty much complete relief of her left knee pain.  Is bothering her a daily basis affecting her work even hurts at rest.  I discussed with her probably 20-30 minutes today regarding options here.  Not unreasonable to type pain she is having to consider diagnostic arthroscopy as I believe the problem is intra-articular based upon her picture after injection.  I explained very clearly that this is a diagnostic in nature and may or may not be able to find out the source of her pain.  Risks remained stable resolve her pain, need for further surgery, infection, blood clot, neurovascular injury, medical anesthetic complications, etc. she understands and will clearly verbalized this in detail and informed consent is given we will proceed as planned    No Follow-up on file.    Guanaco Brady MD

## 2018-03-23 NOTE — OP NOTE
Procedure(s):  DIAGNOSTIC  ARTHROSCOPY LEFT KNEE  Procedure Note    Tiara Verde  3/23/2018    Pre-op Diagnosis:   Chondromalacia of patella, left [M22.42]    Post-op Diagnosis:     Post-Op Diagnosis Codes:     * Chondromalacia of patella, left [M22.42]    Procedure/CPT® Codes:      Procedure(s):  DIAGNOSTIC  ARTHROSCOPY LEFT KNEE with debridement(20339)    Surgeon(s):  Guanaco Brady MD    Anesthesia: Gen.    Staff:   Circulator: Clare Estevez RN; Yeyo Jackson RN  Scrub Person: Roma John  Assistant: Clarisa Sauceda MA    Estimated Blood Loss: minimal    Specimens:                None      Drains:      Findings: Mild chondromalacia patella with significant infrapatellar scarring    Complications: None    Dictation: Indications: 32-year-old female with significant left knee pain.  She's had patellofemoral arthritis in the past and is undergone arthroscopy of both knees did very well with the right side but never progressed well with the left side.  Postoperatively she protected with medications, physical therapy, bracing, injections with no steroid and viscose supplementation.  She has had no relief.  Inconclusive findings on MRI scan intra-articular injection of local anesthetic release 100% of her pain.  She is for diagnostic arthroscopy and procedure as indicated at this point.  Risks and benefits explained to her in detail including failure to resolve her pain, infection, bleeding, blood clot, need for further surgery, medical anesthetic complications, etc. had informed consent is given she understands we will proceed as planned.    Procedure: After adequate general anesthesia was obtained the patient's left leg was prepped and draped the usual sterile fashion.  Timeout was performed prior to procedure.  Tourniquet was inflated to 300 mmHg standard anterolateral anteromedial portals were used using her previous arthroscopy portals.  Debridement was done anterior notch and  visualization there was noted to be an abundance of scar tissue anteriorly in the intercondylar notch and infrapatellar area.  This was debrided to aid in visualization and  synovitis noted here when originally coming through with the arthroscopy scope.     The knee was sequentially examined both the medial lateral gutter were noted be clear no loose bodies noted in the suprapatellar pouch there was mild, ablation mainly at the inferior pole of the patella and this was also debrided of mildly the intercondylar notch had mild arthritic change grade 1-2 this was debrided as well.  Pictures were taken throughout the procedure intraorally operatively.  ACL PCL were probed and noted to be intact no loose bodies noted here the lateral compartment was pristine lateral meniscus was probed and noted to be intact.  Likewise the medial meniscus was probed and intact and also a normal appearing medial compartment.    Edge was then paid further debridement was done anteriorly to make sure with full extension and flexion there is no further impingement of the scar tissue in the intercondylar notch.  None was noted.  Secondary inspection revealed no further pathology instruments withdrawn portals closed with 4-0 nylon suture the knee was injected with lidocaine with epinephrine to aid initial pain control and bleeding.  Sterile bulky dressings were applied she tolerated the procedure well no complications end of dictation thank you    Guanaco Brady MD  03/23/18  8:56 AM

## 2018-03-23 NOTE — ANESTHESIA PREPROCEDURE EVALUATION
Anesthesia Evaluation     no history of anesthetic complications:  NPO Solid Status: > 8 hours  NPO Liquid Status: > 8 hours           Airway   Mallampati: I  TM distance: >3 FB  Neck ROM: full  No difficulty expected  Dental - normal exam     Pulmonary - normal exam    breath sounds clear to auscultation  (+) a smoker (<0.5 PPD) Current Abstained day of surgery,     ROS comment: Seasonal allergies  Cardiovascular - negative cardio ROS and normal exam  Exercise tolerance: good (4-7 METS)    Rhythm: regular  Rate: normal    (-) angina, murmur      Neuro/Psych  (+) psychiatric history Anxiety and Depression,     GI/Hepatic/Renal/Endo - negative ROS     Musculoskeletal     (+) arthralgias,       ROS comment: Left knee pain  Abdominal  - normal exam   Substance History   (+) alcohol use (socially),      OB/GYN negative ob/gyn ROS   (-)  Pregnant        Other   (+) arthritis     (-) history of cancer                  Anesthesia Plan    ASA 2     general   (Pseudocholinesterase deficiency in mother and sister - patient has not been tested  AVOID SUCCINYLCHOLINE)  intravenous induction   Anesthetic plan and risks discussed with patient and spouse/significant other.

## 2018-03-23 NOTE — ANESTHESIA POSTPROCEDURE EVALUATION
Patient: Tiara Verde    Procedure Summary     Date:  03/23/18 Room / Location:  [facility] OR  / [facility] OR    Anesthesia Start:  0743 Anesthesia Stop:  0852    Procedure:  DIAGNOSTIC  ARTHROSCOPY LEFT KNEE (Left Knee) Diagnosis:       Chondromalacia of patella, left      (Chondromalacia of patella, left [M22.42])    Surgeon:  Guanaco Brady MD Provider:  Harvey Parra MD    Anesthesia Type:  general ASA Status:  2          Anesthesia Type: general  Last vitals  BP   135/63 (03/23/18 0600)   Temp   98.3 °F (36.8 °C) (03/23/18 0600)   Pulse   83 (03/23/18 0600)   Resp   18 (03/23/18 0600)     SpO2   96 % (03/23/18 0600)     Post Anesthesia Care and Evaluation    Patient location during evaluation: PACU  Patient participation: complete - patient cannot participate  Pain management: adequate  Airway patency: patent  Anesthetic complications: No anesthetic complications    Cardiovascular status: acceptable  Respiratory status: acceptable  Hydration status: acceptable

## 2018-04-20 RX ORDER — CETIRIZINE HYDROCHLORIDE 10 MG/1
10 TABLET ORAL DAILY
Qty: 30 TABLET | Refills: 5 | Status: SHIPPED | OUTPATIENT
Start: 2018-04-20 | End: 2019-01-14 | Stop reason: SDUPTHER

## 2018-08-27 ENCOUNTER — OFFICE VISIT (OUTPATIENT)
Dept: FAMILY MEDICINE CLINIC | Facility: CLINIC | Age: 33
End: 2018-08-27

## 2018-08-27 VITALS
SYSTOLIC BLOOD PRESSURE: 118 MMHG | BODY MASS INDEX: 29.91 KG/M2 | DIASTOLIC BLOOD PRESSURE: 80 MMHG | HEIGHT: 65 IN | WEIGHT: 179.5 LBS

## 2018-08-27 DIAGNOSIS — F41.9 ANXIETY: ICD-10-CM

## 2018-08-27 DIAGNOSIS — F32.A DEPRESSIVE DISORDER: Primary | ICD-10-CM

## 2018-08-27 PROCEDURE — 99213 OFFICE O/P EST LOW 20 MIN: CPT | Performed by: NURSE PRACTITIONER

## 2018-08-27 RX ORDER — DESVENLAFAXINE 100 MG/1
100 TABLET, EXTENDED RELEASE ORAL DAILY
Qty: 30 TABLET | Refills: 11 | Status: SHIPPED | OUTPATIENT
Start: 2018-08-27 | End: 2018-09-14

## 2018-08-27 RX ORDER — ALPRAZOLAM 0.25 MG/1
0.25 TABLET ORAL 3 TIMES DAILY PRN
Qty: 40 TABLET | Refills: 0 | Status: SHIPPED | OUTPATIENT
Start: 2018-08-27 | End: 2019-04-22

## 2018-08-27 NOTE — PROGRESS NOTES
Chief Complaint   Patient presents with   • Follow-up     adjust meds    • Depression     fine during the day it's at night that she has a problem with   • Med Refill     Subjective   Tiara Verde is a 33 y.o. female.     Depression   Visit Type: follow-up  Patient presents with the following symptoms: decreased concentration, depressed mood, excessive worry, fatigue, feelings of worthlessness, hypersomnia, irritability, malaise, nervousness/anxiety and palpitations.  Patient is not experiencing: anhedonia, chest pain, choking sensation, compulsions, confusion, dizziness, dry mouth, feelings of hopelessness, hyperventilation, impotence, insomnia, obsessions, panic, psychomotor agitation, psychomotor retardation, restlessness, shortness of breath, suicidal ideas, suicidal planning, thoughts of death, weight gain and weight loss.  Frequency of symptoms: most days   Severity: severe   Sleep quality: fair  Nighttime awakenings: none  Compliance with medications:  %        Anxiety   Presents for follow-up visit. Symptoms include decreased concentration, depressed mood, excessive worry, irritability, malaise, nervous/anxious behavior and palpitations. Patient reports no chest pain, compulsions, confusion, dizziness, dry mouth, feeling of choking, hyperventilation, impotence, insomnia, nausea, obsessions, panic, restlessness, shortness of breath or suicidal ideas. Symptoms occur occasionally (meds helps ). The severity of symptoms is mild. The quality of sleep is fair. Nighttime awakenings: none.     Her past medical history is significant for depression. Compliance with medications is %. Side effects of treatment include joint pain and limb pain (pos ra factor seeing rheumatology next month ).        The following portions of the patient's history were reviewed and updated as appropriate: allergies, current medications, past social history and problem list.    Review of Systems   Constitutional:  "Positive for irritability. Negative for weight gain and weight loss.   HENT: Negative.    Eyes: Negative.  Negative for photophobia, pain, discharge, redness, itching and visual disturbance.   Respiratory: Negative.  Negative for apnea, cough, choking, chest tightness, shortness of breath, wheezing and stridor.    Cardiovascular: Positive for palpitations. Negative for chest pain and leg swelling.   Gastrointestinal: Negative.  Negative for abdominal distention, nausea, rectal pain and vomiting.   Endocrine: Negative.  Negative for cold intolerance, heat intolerance, polydipsia, polyphagia and polyuria.   Genitourinary: Negative.  Negative for difficulty urinating, dyspareunia, dysuria, enuresis, flank pain, frequency, impotence, pelvic pain and vaginal pain.   Musculoskeletal: Positive for arthralgias, back pain, gait problem, joint swelling and myalgias. Negative for neck pain and neck stiffness.   Skin: Negative for color change, pallor, rash and wound.   Allergic/Immunologic: Negative for environmental allergies, food allergies and immunocompromised state.   Neurological: Negative for dizziness, tremors, seizures, syncope, facial asymmetry, speech difficulty, weakness, light-headedness, numbness and headaches.   Hematological: Negative for adenopathy. Does not bruise/bleed easily.   Psychiatric/Behavioral: Positive for decreased concentration and sleep disturbance. Negative for agitation, behavioral problems, confusion, dysphoric mood, hallucinations, self-injury and suicidal ideas. The patient is nervous/anxious. The patient does not have insomnia and is not hyperactive.         Stress and panic-worse at night -working 2 jobs, no energy, feels tired all the time.        Objective   /80   Ht 165.1 cm (65\")   Wt 81.4 kg (179 lb 8 oz)   BMI 29.87 kg/m²   Physical Exam   Constitutional: She is oriented to person, place, and time. She appears well-developed and well-nourished. No distress.   HENT:   Head: " Normocephalic and atraumatic.   Right Ear: External ear normal.   Left Ear: External ear normal.   Mouth/Throat: Oropharynx is clear and moist. No oropharyngeal exudate.   Eyes: Pupils are equal, round, and reactive to light. EOM are normal. Right eye exhibits no discharge. Left eye exhibits no discharge. No scleral icterus.   Neck: Normal range of motion. No JVD present. No tracheal deviation present. No thyromegaly present.   Cardiovascular: Normal rate, regular rhythm, normal heart sounds, intact distal pulses and normal pulses.  Exam reveals no gallop and no friction rub.    No murmur heard.  Pulmonary/Chest: Effort normal and breath sounds normal. No stridor. No respiratory distress. She has no wheezes. She has no rales. She exhibits no tenderness.   Abdominal: Soft. Bowel sounds are normal. She exhibits no distension and no mass. There is no tenderness. There is no rebound and no guarding. No hernia.   Genitourinary: Rectum normal and vagina normal. Pelvic exam was performed with patient supine. Uterus is not deviated, not enlarged, not fixed and not tender. Cervix exhibits no motion tenderness, no discharge and no friability. Right adnexum displays no mass. Left adnexum displays no mass.   Musculoskeletal: Normal range of motion. She exhibits no edema, tenderness or deformity.        Right shoulder: She exhibits swelling and pain. She exhibits no spasm.        Right knee: She exhibits bony tenderness.        Left knee: She exhibits bony tenderness.   Lymphadenopathy:     She has no cervical adenopathy.   Neurological: She is alert and oriented to person, place, and time. She displays normal reflexes. No cranial nerve deficit or sensory deficit. She exhibits normal muscle tone. Coordination normal.   Skin: Skin is warm. No rash noted. She is not diaphoretic. No erythema. No pallor.   Psychiatric: She has a normal mood and affect.   Nursing note and vitals reviewed.      Assessment/Plan   Problem List Items  Addressed This Visit        Other    Depressive disorder - Primary    Relevant Medications    desvenlafaxine (PRISTIQ) 100 MG 24 hr tablet    ALPRAZolam (XANAX) 0.25 MG tablet    Anxiety           New Medications Ordered This Visit   Medications   • desvenlafaxine (PRISTIQ) 100 MG 24 hr tablet     Sig: Take 1 tablet by mouth Daily.     Dispense:  30 tablet     Refill:  11   • ALPRAZolam (XANAX) 0.25 MG tablet     Sig: Take 1 tablet by mouth 3 (Three) Times a Day As Needed for Anxiety.     Dispense:  40 tablet     Refill:  0   Patient understands the risks associated with this controlled medication, including tolerance and addiction.  she also agrees to only obtain this medication from me, and not from a another provider, unless that provider is covering for me in my absence.  she also agrees to be compliant in dosing, and not self adjust the dose of medication.  A signed controlled substance agreement is on file, and she has received a controlled substance education sheet at this a previous visit.  she has also signed a consent for treatment with a controlled substance as per [facility] policy. POONAM was obtained.     increase pristiq to 100mg and recheck in 4 weeks as directed

## 2018-09-13 ENCOUNTER — PRIOR AUTHORIZATION (OUTPATIENT)
Dept: FAMILY MEDICINE CLINIC | Facility: CLINIC | Age: 33
End: 2018-09-13

## 2018-09-14 ENCOUNTER — PRIOR AUTHORIZATION (OUTPATIENT)
Dept: FAMILY MEDICINE CLINIC | Facility: CLINIC | Age: 33
End: 2018-09-14

## 2018-09-14 RX ORDER — VENLAFAXINE HYDROCHLORIDE 75 MG/1
75 CAPSULE, EXTENDED RELEASE ORAL DAILY
Qty: 30 CAPSULE | Refills: 11 | Status: SHIPPED | OUTPATIENT
Start: 2018-09-14 | End: 2019-04-22 | Stop reason: DRUGHIGH

## 2018-12-06 ENCOUNTER — OFFICE VISIT (OUTPATIENT)
Dept: ORTHOPEDIC SURGERY | Facility: CLINIC | Age: 33
End: 2018-12-06

## 2018-12-06 VITALS — BODY MASS INDEX: 30.19 KG/M2 | HEIGHT: 65 IN | WEIGHT: 181.2 LBS

## 2018-12-06 DIAGNOSIS — M22.42 CHONDROMALACIA OF PATELLA, LEFT: Primary | ICD-10-CM

## 2018-12-06 DIAGNOSIS — M25.462 KNEE EFFUSION, LEFT: ICD-10-CM

## 2018-12-06 DIAGNOSIS — G89.29 CHRONIC PAIN OF LEFT KNEE: ICD-10-CM

## 2018-12-06 DIAGNOSIS — M25.562 CHRONIC PAIN OF LEFT KNEE: ICD-10-CM

## 2018-12-06 PROCEDURE — 99213 OFFICE O/P EST LOW 20 MIN: CPT | Performed by: ORTHOPAEDIC SURGERY

## 2018-12-06 NOTE — PROGRESS NOTES
"Tiara Verde is a 33 y.o. female returns for     Chief Complaint   Patient presents with   • Left Knee - Follow-up       HISTORY OF PRESENT ILLNESS: Patient being seen for left knee follow up. Patient had Left knee arthroscopy with debridement performed 3/23/18. Patient reports pain is constant 7/10.  It is worse with activity associated with swelling radiates down the leg.  She reports that really she did not get much better after her last arthroscopy.  She is not got better with anything we've injected in her knees.  No buckling catching giving way.  She's had previous workup for inflammatory condition and even seen rheumatology in the past but has been inconclusive.  He does radiate down the leg and it is on a daily basis       CONCURRENT MEDICAL HISTORY:    The following portions of the patient's history were reviewed and updated as appropriate: allergies, current medications, past family history, past medical history, past social history, past surgical history and problem list.     ROS  No fevers or chills.  No chest pain or shortness of air.  No GI or  disturbances.    PHYSICAL EXAMINATION:       Ht 165.1 cm (65\")   Wt 82.2 kg (181 lb 3.2 oz)   BMI 30.15 kg/m²     Physical Exam   Constitutional: She is oriented to person, place, and time. She appears well-developed and well-nourished.   HENT:   Head: Normocephalic and atraumatic.   Eyes: EOM are normal. Pupils are equal, round, and reactive to light.   Neck: Neck supple.   Pulmonary/Chest: Effort normal.   Musculoskeletal: Normal range of motion. She exhibits tenderness.   Neurological: She is alert and oriented to person, place, and time.   Skin: Skin is warm and dry.   Psychiatric: She has a normal mood and affect.   Vitals reviewed.      GAIT:     [x]  Normal  []  Antalgic    Assistive device: [x]  None  []  Walker     []  Crutches  []  Cane     []  Wheelchair  []  Stretcher    Ortho Exam  Mild swelling in the lunate or the medial lateral " joint line to a lesser extent over the patella ligaments are stable calf is negative neurovascularly intact distally.  Good motion of hip without pain.      MRI left knee without contrast.     HISTORY: Pain, instability. Chondromalacia patella.     Prior exam: Left knee March 5, 2018.     TECHNIQUE: Multiplanar multisequence noncontrast images right  knee.     FINDINGS: Normal patellar articular hyaline cartilage. Subtle  bone edema inferior aspect of patella. This may represent  contusion or sequela of traction type injury. No fractures  observed.     Normal medial collateral ligament and lateral collateral ligament  complex.     Normal medial and lateral menisci. Normal anterior and posterior  cruciate ligaments.     IMPRESSION:  CONCLUSION: Subtle bone edema inferior aspect of patella of  uncertain significance. Normal patellar articular hyaline  cartilage. MRI left knee is otherwise unremarkable.     Electronically signed by:  Danish Garcia MD  3/13/2018 1:32 PM CDT  Workstation: [id number]        ASSESSMENT:    Diagnoses and all orders for this visit:    Chondromalacia of patella, left  -     NM Bone Scan Limited; Future    Chronic pain of left knee  -     NM Bone Scan Limited; Future    Knee effusion, left  -     NM Bone Scan Limited; Future          PLAN discussed this with her again I'm not really sure we can do for I think I'm looking for other problems are causing referred pain I think a bone scan is a reasonable thing to proceed with at this time we'll evaluate her hip her knee femur.  We'll see her back after this.    No Follow-up on file.    Guanaco Brady MD

## 2018-12-24 ENCOUNTER — HOSPITAL ENCOUNTER (OUTPATIENT)
Dept: NUCLEAR MEDICINE | Facility: HOSPITAL | Age: 33
Discharge: HOME | End: 2018-12-24

## 2018-12-24 DIAGNOSIS — M25.562 CHRONIC PAIN OF LEFT KNEE: ICD-10-CM

## 2018-12-24 DIAGNOSIS — M25.462 KNEE EFFUSION, LEFT: ICD-10-CM

## 2018-12-24 DIAGNOSIS — M22.42 CHONDROMALACIA OF PATELLA, LEFT: ICD-10-CM

## 2018-12-24 DIAGNOSIS — G89.29 CHRONIC PAIN OF LEFT KNEE: ICD-10-CM

## 2018-12-24 PROCEDURE — A9503 TC99M MEDRONATE: HCPCS | Performed by: ORTHOPAEDIC SURGERY

## 2018-12-24 PROCEDURE — 78300 BONE IMAGING LIMITED AREA: CPT

## 2018-12-24 PROCEDURE — 0 TECHNETIUM MEDRONATE KIT: Performed by: ORTHOPAEDIC SURGERY

## 2018-12-24 RX ORDER — TC 99M MEDRONATE 20 MG/10ML
27.1 INJECTION, POWDER, LYOPHILIZED, FOR SOLUTION INTRAVENOUS
Status: COMPLETED | OUTPATIENT
Start: 2018-12-24 | End: 2018-12-24

## 2018-12-24 RX ADMIN — Medication 27.1 MILLICURIE: at 07:15

## 2018-12-26 ENCOUNTER — HOSPITAL ENCOUNTER (OUTPATIENT)
Dept: NUCLEAR MEDICINE | Facility: HOSPITAL | Age: 33
End: 2018-12-26

## 2018-12-26 ENCOUNTER — APPOINTMENT (OUTPATIENT)
Dept: NUCLEAR MEDICINE | Facility: HOSPITAL | Age: 33
End: 2018-12-26

## 2018-12-27 ENCOUNTER — OFFICE VISIT (OUTPATIENT)
Dept: ORTHOPEDIC SURGERY | Facility: CLINIC | Age: 33
End: 2018-12-27

## 2018-12-27 VITALS — HEIGHT: 65 IN | BODY MASS INDEX: 30.16 KG/M2 | WEIGHT: 181 LBS

## 2018-12-27 DIAGNOSIS — M22.42 CHONDROMALACIA OF LEFT PATELLA: ICD-10-CM

## 2018-12-27 DIAGNOSIS — M22.42 CHONDROMALACIA OF PATELLA, LEFT: Primary | ICD-10-CM

## 2018-12-27 PROCEDURE — 99213 OFFICE O/P EST LOW 20 MIN: CPT | Performed by: ORTHOPAEDIC SURGERY

## 2018-12-27 NOTE — PROGRESS NOTES
"Tiara Verde is a 33 y.o. female returns for     Chief Complaint   Patient presents with   • Results     Bone Scan results       HISTORY OF PRESENT ILLNESS: Patient is here today for Bone scan results. Patient states that her pain is 6/10.  Still hurting about the same.  Have reviewed the bone scan ago from mid femur to mid tibia.  There is some increased activity which is  symmetric distal femur and patella.  Clinically this is consistent with mild degenerative change.  We certainly know what the inside of her knee looks like.       CONCURRENT MEDICAL HISTORY:    The following portions of the patient's history were reviewed and updated as appropriate: allergies, current medications, past family history, past medical history, past social history, past surgical history and problem list.     ROS  No fevers or chills.  No chest pain or shortness of air.  No GI or  disturbances.    PHYSICAL EXAMINATION:       Ht 165.1 cm (65\")   Wt 82.1 kg (181 lb)   BMI 30.12 kg/m²     Physical Exam   Constitutional: She is oriented to person, place, and time. She appears well-developed and well-nourished.   HENT:   Head: Normocephalic and atraumatic.   Eyes: EOM are normal. Pupils are equal, round, and reactive to light.   Neck: Neck supple.   Pulmonary/Chest: Effort normal.   Musculoskeletal: She exhibits tenderness.   Neurological: She is alert and oriented to person, place, and time.   Skin: Skin is warm and dry.   Psychiatric: She has a normal mood and affect.   Vitals reviewed.      GAIT:     [x]  Normal  []  Antalgic    Assistive device: [x]  None  []  Walker     []  Crutches  []  Cane     []  Wheelchair  []  Stretcher    Ortho Exam   Tender to palpation no real effusion today tender both medial and laterally motion well maintained.  Ligaments are grossly stable.    Nm Bone Scan Limited    Result Date: 12/24/2018  Narrative: Nuclear medicine limited bone scan History: Left leg pain. Left knee pain. " Chondromalacia patella. Correlation: Radiograph March 5, 2018. MRI March 13, 2018. Following the intravenous administration of 27.1 millicuries of technetium 99m MDP, delayed images of the knees obtained in anterior-posterior and lateral projections. Relatively symmetric minimal increased uptake in each distal femur, patella and proximal fibula, compatible with degenerative change. No asymmetric area of increased uptake to suggest fracture or infection. No asymmetric area of diminished uptake to suggest osteonecrosis.     Impression: Conclusion: Relatively symmetric minimal increased uptake in each distal femur, patella and proximal fibula, compatible with degenerative change. [id number] Electronically signed by:  Guille Blue MD  12/24/2018 12:38 PM CST Workstation: [id number]            ASSESSMENT:    Diagnoses and all orders for this visit:    Chondromalacia of patella, left    Chondromalacia of left patella          PLAN I discussed this with her.  Certainly no surgery is indicated.  She's had a workup for inflammatory arthritis.  My consider platelet rich plasma discussed this with her I've also discussed themselves therapy but we did not do that.  She is to consider her options are given her some websites to think about.  I'm really not sure what else I can offer her at this point.  She is in a V no how she wishes to proceed    No Follow-up on file.    Guanaco Brady MD

## 2019-01-14 RX ORDER — CETIRIZINE HYDROCHLORIDE 10 MG/1
10 TABLET ORAL DAILY
Qty: 30 TABLET | Refills: 5 | Status: SHIPPED | OUTPATIENT
Start: 2019-01-14 | End: 2019-04-22

## 2019-02-04 ENCOUNTER — TELEPHONE (OUTPATIENT)
Dept: FAMILY MEDICINE CLINIC | Facility: CLINIC | Age: 34
End: 2019-02-04

## 2019-02-04 RX ORDER — VENLAFAXINE HYDROCHLORIDE 150 MG/1
150 CAPSULE, EXTENDED RELEASE ORAL DAILY
Qty: 30 CAPSULE | Refills: 11 | Status: SHIPPED | OUTPATIENT
Start: 2019-02-04 | End: 2019-04-22

## 2019-02-04 NOTE — TELEPHONE ENCOUNTER
She was wanting to see if you would increase the dose . She has noticed her anxiety has been worse the past couple of weeks and there isn't anything really going to cause this. She is taking Effexor 75 mg daily

## 2019-02-04 NOTE — TELEPHONE ENCOUNTER
She called and wants to talk to Daniela faust has some questions regarding her Effexor and can be reached at [phone].

## 2019-04-22 ENCOUNTER — OFFICE VISIT (OUTPATIENT)
Dept: FAMILY MEDICINE CLINIC | Facility: CLINIC | Age: 34
End: 2019-04-22

## 2019-04-22 VITALS
DIASTOLIC BLOOD PRESSURE: 78 MMHG | SYSTOLIC BLOOD PRESSURE: 102 MMHG | HEIGHT: 65 IN | BODY MASS INDEX: 28.66 KG/M2 | WEIGHT: 172 LBS

## 2019-04-22 DIAGNOSIS — F41.9 ANXIETY: ICD-10-CM

## 2019-04-22 DIAGNOSIS — Z11.51 ENCOUNTER FOR SCREENING FOR HUMAN PAPILLOMAVIRUS (HPV): Primary | ICD-10-CM

## 2019-04-22 PROCEDURE — G0123 SCREEN CERV/VAG THIN LAYER: HCPCS | Performed by: NURSE PRACTITIONER

## 2019-04-22 PROCEDURE — 99213 OFFICE O/P EST LOW 20 MIN: CPT | Performed by: NURSE PRACTITIONER

## 2019-04-22 PROCEDURE — 87624 HPV HI-RISK TYP POOLED RSLT: CPT | Performed by: NURSE PRACTITIONER

## 2019-04-22 RX ORDER — BUSPIRONE HYDROCHLORIDE 7.5 MG/1
TABLET ORAL
Qty: 60 TABLET | Refills: 11 | Status: SHIPPED | OUTPATIENT
Start: 2019-04-22 | End: 2019-06-28

## 2019-04-22 RX ORDER — BUPROPION HYDROCHLORIDE 100 MG/1
100 TABLET, EXTENDED RELEASE ORAL 2 TIMES DAILY
Qty: 60 TABLET | Refills: 11 | Status: SHIPPED | OUTPATIENT
Start: 2019-04-22 | End: 2019-06-28

## 2019-04-22 NOTE — PROGRESS NOTES
Chief Complaint   Patient presents with   • Annual Exam   • Depression     prblems with the effexor     Subjective   Tiara Stephanie Verde is a 33 y.o. female.     Gynecologic Exam   The patient's pertinent negatives include no genital itching, genital lesions, genital odor, genital rash, missed menses, pelvic pain, vaginal bleeding or vaginal discharge. This is a new problem. The problem has been gradually worsening. The patient is experiencing no pain. She is not pregnant. Pertinent negatives include no abdominal pain, anorexia, back pain, chills, constipation, diarrhea, discolored urine, dysuria, fever, flank pain, frequency, headaches, hematuria, joint pain, joint swelling, nausea, painful intercourse, rash, sore throat, urgency or vomiting. She is sexually active. No, her partner does not have an STD. Her menstrual history has been regular. There is no history of an abdominal surgery, a  section, an ectopic pregnancy, endometriosis, a gynecological surgery, herpes simplex, menorrhagia, metrorrhagia, miscarriage, ovarian cysts, perineal abscess, PID, an STD, a terminated pregnancy or vaginosis.   Anxiety   Presents for follow-up visit. Symptoms include decreased concentration, depressed mood, insomnia, nervous/anxious behavior, palpitations and panic. Patient reports no chest pain, compulsions, confusion, dizziness, dry mouth, excessive worry, feeling of choking, hyperventilation, impotence, irritability, malaise, muscle tension, nausea, obsessions, restlessness, shortness of breath or suicidal ideas. Symptoms occur most days. The quality of sleep is good. Nighttime awakenings: none.     Compliance with medications is %.        The following portions of the patient's history were reviewed and updated as appropriate: allergies, current medications, past social history and problem list.    Review of Systems   Constitutional: Positive for activity change, appetite change, fatigue and unexpected  weight change. Negative for chills, diaphoresis, fever and irritability.   HENT: Negative.  Negative for congestion, dental problem and sore throat.    Eyes: Negative.  Negative for photophobia, pain, discharge, redness, itching and visual disturbance.   Respiratory: Negative.  Negative for apnea, cough, choking, chest tightness, shortness of breath, wheezing and stridor.    Cardiovascular: Positive for palpitations. Negative for chest pain and leg swelling.   Gastrointestinal: Negative.  Negative for abdominal distention, abdominal pain, anorexia, constipation, diarrhea, nausea, rectal pain and vomiting.   Endocrine: Negative.  Negative for cold intolerance, heat intolerance, polydipsia, polyphagia and polyuria.   Genitourinary: Negative.  Negative for decreased urine volume, difficulty urinating, dyspareunia, dysuria, enuresis, flank pain, frequency, genital sores, hematuria, impotence, menorrhagia, menstrual problem, missed menses, pelvic pain, urgency, vaginal bleeding, vaginal discharge and vaginal pain.   Musculoskeletal: Negative.  Negative for arthralgias, back pain, gait problem, joint pain, joint swelling, myalgias, neck pain and neck stiffness.   Skin: Negative for color change, pallor, rash and wound.   Allergic/Immunologic: Negative.  Negative for environmental allergies, food allergies and immunocompromised state.   Neurological: Negative.  Negative for dizziness, tremors, seizures, syncope, facial asymmetry, speech difficulty, weakness, light-headedness, numbness and headaches.   Hematological: Negative.  Negative for adenopathy. Does not bruise/bleed easily.   Psychiatric/Behavioral: Positive for decreased concentration. Negative for agitation, behavioral problems, confusion, dysphoric mood, hallucinations, self-injury, sleep disturbance and suicidal ideas. The patient is nervous/anxious and has insomnia. The patient is not hyperactive.         Weight gain and sexual side effects with effexor   "      Objective   /78   Ht 165.1 cm (65\")   Wt 78 kg (172 lb)   BMI 28.62 kg/m²   Physical Exam   Constitutional: She is oriented to person, place, and time. She appears well-developed and well-nourished. No distress.   HENT:   Head: Normocephalic and atraumatic.   Right Ear: External ear normal.   Left Ear: External ear normal.   Nose: Nose normal.   Mouth/Throat: Oropharynx is clear and moist. No oropharyngeal exudate.   Eyes: EOM are normal. Pupils are equal, round, and reactive to light. Right eye exhibits no discharge. Left eye exhibits no discharge. No scleral icterus.   Neck: Normal range of motion. No JVD present. No tracheal deviation present. No thyromegaly present.   Cardiovascular: Normal rate, regular rhythm, normal heart sounds, intact distal pulses and normal pulses. Exam reveals no gallop and no friction rub.   No murmur heard.  Pulmonary/Chest: Effort normal and breath sounds normal. No stridor. No respiratory distress. She has no wheezes. She has no rales. She exhibits no tenderness.   Abdominal: Soft. Bowel sounds are normal. She exhibits no distension and no mass. There is no tenderness. There is no rebound and no guarding. No hernia. Hernia confirmed negative in the right inguinal area and confirmed negative in the left inguinal area.   Genitourinary: Rectum normal and vagina normal. Rectal exam shows no external hemorrhoid, no internal hemorrhoid, no fissure, no mass, no tenderness, anal tone normal and guaiac negative stool. Pelvic exam was performed with patient supine. No labial fusion. There is no rash, tenderness, lesion or injury on the right labia. There is no rash, tenderness, lesion or injury on the left labia. Uterus is not deviated, not enlarged, not fixed and not tender. Cervix exhibits no motion tenderness, no discharge and no friability. Right adnexum displays no mass, no tenderness and no fullness. Left adnexum displays no mass, no tenderness and no fullness. No " erythema, tenderness or bleeding in the vagina. No foreign body in the vagina. No signs of injury around the vagina. No vaginal discharge found.   Musculoskeletal: Normal range of motion. She exhibits no edema, tenderness or deformity.        Right shoulder: She exhibits swelling and pain. She exhibits no spasm.        Right knee: She exhibits bony tenderness.        Left knee: She exhibits bony tenderness.   Lymphadenopathy:     She has no cervical adenopathy. No inguinal adenopathy noted on the right or left side.   Neurological: She is alert and oriented to person, place, and time. She displays normal reflexes. No cranial nerve deficit or sensory deficit. She exhibits normal muscle tone. Coordination normal.   Skin: Skin is warm. No rash noted. She is not diaphoretic. No erythema. No pallor.   Psychiatric: She has a normal mood and affect.   Nursing note and vitals reviewed.      Assessment/Plan   Problem List Items Addressed This Visit        Other    Encounter for screening for human papillomavirus (HPV) - Primary    Relevant Orders    Liquid-based Pap Smear, Screening    Anxiety           New Medications Ordered This Visit   Medications   • buPROPion SR (WELLBUTRIN SR) 100 MG 12 hr tablet     Sig: Take 1 tablet by mouth 2 (Two) Times a Day.     Dispense:  60 tablet     Refill:  11   • busPIRone (BUSPAR) 7.5 MG tablet     Sig: Tid prn     Dispense:  60 tablet     Refill:  11       Stress relief discussed in length. Consider therapy, suggest yoga, exercise, meditation -patient is agrees-will call back if worsen for referral      wean off effexor for 2 weeks and change to wellbutrin buspar 7.5mg bid prn

## 2019-04-23 LAB
GEN CATEG CVX/VAG CYTO-IMP: NORMAL
LAB AP CASE REPORT: NORMAL
LAB AP GYN ADDITIONAL INFORMATION: NORMAL
PATH INTERP SPEC-IMP: NORMAL
STAT OF ADQ CVX/VAG CYTO-IMP: NORMAL

## 2019-04-26 LAB — HPV I/H RISK 4 DNA CVX QL PROBE+SIG AMP: POSITIVE

## 2019-07-08 RX ORDER — VARENICLINE TARTRATE 0.5 MG/1
0.5 TABLET, FILM COATED ORAL 2 TIMES DAILY
Qty: 60 TABLET | Refills: 3 | Status: SHIPPED | OUTPATIENT
Start: 2019-07-08 | End: 2019-12-11

## 2019-12-11 ENCOUNTER — APPOINTMENT (OUTPATIENT)
Dept: LAB | Facility: HOSPITAL | Age: 34
End: 2019-12-11

## 2019-12-11 ENCOUNTER — OFFICE VISIT (OUTPATIENT)
Dept: FAMILY MEDICINE CLINIC | Facility: CLINIC | Age: 34
End: 2019-12-11

## 2019-12-11 VITALS
DIASTOLIC BLOOD PRESSURE: 64 MMHG | SYSTOLIC BLOOD PRESSURE: 102 MMHG | BODY MASS INDEX: 26.66 KG/M2 | WEIGHT: 160 LBS | HEIGHT: 65 IN

## 2019-12-11 DIAGNOSIS — R53.81 MALAISE AND FATIGUE: Primary | ICD-10-CM

## 2019-12-11 DIAGNOSIS — R53.83 MALAISE AND FATIGUE: Primary | ICD-10-CM

## 2019-12-11 DIAGNOSIS — E34.9 HORMONE DISTURBANCE: ICD-10-CM

## 2019-12-11 PROCEDURE — 84402 ASSAY OF FREE TESTOSTERONE: CPT | Performed by: NURSE PRACTITIONER

## 2019-12-11 PROCEDURE — 36415 COLL VENOUS BLD VENIPUNCTURE: CPT | Performed by: NURSE PRACTITIONER

## 2019-12-11 PROCEDURE — 84443 ASSAY THYROID STIM HORMONE: CPT | Performed by: NURSE PRACTITIONER

## 2019-12-11 PROCEDURE — 99213 OFFICE O/P EST LOW 20 MIN: CPT | Performed by: NURSE PRACTITIONER

## 2019-12-11 PROCEDURE — 84403 ASSAY OF TOTAL TESTOSTERONE: CPT | Performed by: NURSE PRACTITIONER

## 2019-12-11 PROCEDURE — 84144 ASSAY OF PROGESTERONE: CPT | Performed by: NURSE PRACTITIONER

## 2019-12-11 PROCEDURE — 82670 ASSAY OF TOTAL ESTRADIOL: CPT | Performed by: NURSE PRACTITIONER

## 2019-12-11 PROCEDURE — 83540 ASSAY OF IRON: CPT | Performed by: NURSE PRACTITIONER

## 2019-12-11 NOTE — PROGRESS NOTES
Chief Complaint   Patient presents with   • hormone issues     Subjective   Tiara Verde is a 34 y.o. female.     -*-*    Gynecologic Exam   The patient's pertinent negatives include no genital itching, genital lesions, genital odor, genital rash, missed menses, pelvic pain, vaginal bleeding or vaginal discharge. This is a new problem. The problem has been gradually worsening. The patient is experiencing no pain. She is not pregnant. Pertinent negatives include no abdominal pain, anorexia, back pain, chills, constipation, diarrhea, discolored urine, dysuria, fever, flank pain, frequency, headaches, hematuria, joint pain, joint swelling, nausea, painful intercourse, rash, sore throat, urgency or vomiting. She is sexually active. No, her partner does not have an STD. Her menstrual history has been regular. There is no history of an abdominal surgery, a  section, an ectopic pregnancy, endometriosis, a gynecological surgery, herpes simplex, menorrhagia, metrorrhagia, miscarriage, ovarian cysts, perineal abscess, PID, an STD, a terminated pregnancy or vaginosis.   Anxiety   Presents for follow-up visit. Symptoms include decreased concentration, depressed mood, insomnia, nervous/anxious behavior, palpitations and panic. Patient reports no chest pain, compulsions, confusion, dizziness, dry mouth, excessive worry, feeling of choking, hyperventilation, impotence, irritability, malaise, muscle tension, nausea, obsessions, restlessness, shortness of breath or suicidal ideas. Symptoms occur most days. The quality of sleep is good. Nighttime awakenings: none.     Compliance with medications is %.        The following portions of the patient's history were reviewed and updated as appropriate: allergies, current medications, past social history and problem list.    Review of Systems   Constitutional: Positive for activity change, appetite change, fatigue and unexpected weight change. Negative for chills,  diaphoresis, fever and irritability.   HENT: Negative.  Negative for congestion, dental problem, drooling, ear discharge, ear pain, facial swelling and sore throat.    Eyes: Negative.  Negative for photophobia, pain, discharge, redness, itching and visual disturbance.   Respiratory: Negative.  Negative for apnea, cough, choking, chest tightness, shortness of breath, wheezing and stridor.    Cardiovascular: Positive for palpitations. Negative for chest pain and leg swelling.   Gastrointestinal: Negative.  Negative for abdominal distention, abdominal pain, anal bleeding, anorexia, blood in stool, constipation, diarrhea, nausea, rectal pain and vomiting.   Endocrine: Negative.  Negative for cold intolerance, heat intolerance, polydipsia, polyphagia and polyuria.   Genitourinary: Positive for menstrual problem. Negative for decreased urine volume, difficulty urinating, dyspareunia, dysuria, enuresis, flank pain, frequency, genital sores, hematuria, impotence, menorrhagia, missed menses, pelvic pain, urgency, vaginal bleeding, vaginal discharge and vaginal pain.        Irregular periods and hot flashes    Musculoskeletal: Negative.  Negative for arthralgias, back pain, gait problem, joint pain, joint swelling, myalgias, neck pain and neck stiffness.   Skin: Negative for color change, pallor, rash and wound.   Allergic/Immunologic: Negative.  Negative for environmental allergies, food allergies and immunocompromised state.   Neurological: Negative.  Negative for dizziness, tremors, seizures, syncope, facial asymmetry, speech difficulty, weakness, light-headedness, numbness and headaches.   Hematological: Negative.  Negative for adenopathy. Does not bruise/bleed easily.   Psychiatric/Behavioral: Positive for decreased concentration and sleep disturbance. Negative for agitation, behavioral problems, confusion, dysphoric mood, hallucinations, self-injury and suicidal ideas. The patient is nervous/anxious and has insomnia.  "The patient is not hyperactive.         Weight gain and sexual side effects with effexor       wellbutrin makes her nervous        Objective   /64   Ht 165.1 cm (65\")   Wt 72.6 kg (160 lb)   BMI 26.63 kg/m²   Physical Exam   Constitutional: She is oriented to person, place, and time. She appears well-developed and well-nourished. No distress.   HENT:   Head: Normocephalic and atraumatic.   Right Ear: External ear normal.   Left Ear: External ear normal.   Nose: Nose normal.   Mouth/Throat: Oropharynx is clear and moist. No oropharyngeal exudate.   Eyes: Pupils are equal, round, and reactive to light. EOM are normal. Right eye exhibits no discharge. Left eye exhibits no discharge. No scleral icterus.   Neck: Normal range of motion. No JVD present. No tracheal deviation present. No thyromegaly present.   Cardiovascular: Normal rate, regular rhythm, normal heart sounds, intact distal pulses and normal pulses. Exam reveals no gallop and no friction rub.   No murmur heard.  Pulmonary/Chest: Effort normal and breath sounds normal. No stridor. No respiratory distress. She has no wheezes. She has no rales. She exhibits no tenderness.   Abdominal: Soft. Bowel sounds are normal. She exhibits no distension and no mass. There is no tenderness. There is no rebound and no guarding. No hernia.   Musculoskeletal: Normal range of motion. She exhibits no edema, tenderness or deformity.        Right shoulder: She exhibits swelling and pain. She exhibits no spasm.        Right knee: She exhibits bony tenderness.        Left knee: She exhibits bony tenderness.   Lymphadenopathy:     She has no cervical adenopathy.   Neurological: She is alert and oriented to person, place, and time. She displays normal reflexes. No cranial nerve deficit or sensory deficit. She exhibits normal muscle tone. Coordination normal.   Skin: Skin is warm. No rash noted. She is not diaphoretic. No erythema. No pallor.   Psychiatric: She has a normal " mood and affect.   Nursing note and vitals reviewed.      Assessment/Plan   Problem List Items Addressed This Visit        Endocrine    Hormone disturbance    Relevant Orders    Progesterone    Estradiol    Testosterone, F Eqlib & T LC / MS    TSH    Iron       Other    Malaise and fatigue - Primary    Relevant Orders    Progesterone    Estradiol    Testosterone, F Eqlib & T LC / MS    TSH    Iron         No orders of the defined types were placed in this encounter.      It's not just what you eat, but when you eat  Eat breakfast, and eat smaller meals throughout the day. A healthy breakfast can jumpstart your metabolism, while eating small, healthy meals (rather than the standard three large meals) keeps your energy up.   Avoid eating at night. Try to eat dinner earlier and fast for 14-16 hours until breakfast the next morning. Studies suggest that eating only when you’re most active and giving your digestive system a long break each day may help to regulate weight.     Labs as directed, diet discussed, meds reviewed, instructed to follow up after the results of labs for further treatment

## 2019-12-12 LAB
ESTRADIOL SERPL HS-MCNC: 77.6 PG/ML
IRON 24H UR-MRATE: 110 MCG/DL (ref 37–145)
PROGEST SERPL-MCNC: 7.65 NG/ML
TSH SERPL DL<=0.05 MIU/L-ACNC: 1.61 UIU/ML (ref 0.27–4.2)

## 2019-12-16 RX ORDER — LEVONORGESTREL / ETHINYL ESTRADIOL AND ETHINYL ESTRADIOL 150-30(84)
1 KIT ORAL DAILY
Qty: 91 EACH | Refills: 3 | Status: SHIPPED | OUTPATIENT
Start: 2019-12-16 | End: 2020-11-27

## 2019-12-19 LAB
TESTOST FREE MFR SERPL: 1.1 % (ref 0.5–2.8)
TESTOST FREE SERPL-MCNC: 0.36 NG/DL (ref 0.1–0.85)
TESTOST SERPL-MCNC: 33 NG/DL (ref 10–55)

## 2020-03-25 RX ORDER — ESCITALOPRAM OXALATE 10 MG/1
10 TABLET ORAL DAILY
Qty: 30 TABLET | Refills: 5 | Status: SHIPPED | OUTPATIENT
Start: 2020-03-25 | End: 2020-10-09 | Stop reason: SDUPTHER

## 2020-10-09 RX ORDER — ESCITALOPRAM OXALATE 10 MG/1
TABLET ORAL
Qty: 30 TABLET | Refills: 2 | Status: SHIPPED | OUTPATIENT
Start: 2020-10-09 | End: 2020-11-27

## 2020-11-05 RX ORDER — METRONIDAZOLE 500 MG/1
500 TABLET ORAL 2 TIMES DAILY
Qty: 14 TABLET | Refills: 0 | Status: SHIPPED | OUTPATIENT
Start: 2020-11-05 | End: 2020-11-27

## 2020-11-05 RX ORDER — METRONIDAZOLE 500 MG/1
500 TABLET ORAL 3 TIMES DAILY
Qty: 14 TABLET | Refills: 0 | Status: SHIPPED | OUTPATIENT
Start: 2020-11-05 | End: 2020-11-27

## 2020-11-29 ENCOUNTER — HOSPITAL ENCOUNTER (EMERGENCY)
Facility: HOSPITAL | Age: 35
Discharge: HOME | End: 2020-11-29
Attending: FAMILY MEDICINE | Admitting: FAMILY MEDICINE

## 2020-11-29 VITALS
SYSTOLIC BLOOD PRESSURE: 145 MMHG | RESPIRATION RATE: 18 BRPM | HEART RATE: 73 BPM | BODY MASS INDEX: 26.99 KG/M2 | OXYGEN SATURATION: 98 % | DIASTOLIC BLOOD PRESSURE: 81 MMHG | TEMPERATURE: 98.1 F | WEIGHT: 162 LBS | HEIGHT: 65 IN

## 2020-11-29 DIAGNOSIS — K12.0 APHTHAE, ORAL: Primary | ICD-10-CM

## 2020-11-29 DIAGNOSIS — B00.2 PRIMARY HSV INFECTION OF MOUTH: ICD-10-CM

## 2020-11-29 PROCEDURE — 99283 EMERGENCY DEPT VISIT LOW MDM: CPT

## 2020-11-29 RX ORDER — HYDROCODONE BITARTRATE AND ACETAMINOPHEN 7.5; 325 MG/1; MG/1
1 TABLET ORAL ONCE
Status: COMPLETED | OUTPATIENT
Start: 2020-11-29 | End: 2020-11-29

## 2020-11-29 RX ORDER — ACYCLOVIR 800 MG/1
800 TABLET ORAL
Status: DISCONTINUED | OUTPATIENT
Start: 2020-11-29 | End: 2020-11-29 | Stop reason: HOSPADM

## 2020-11-29 RX ORDER — HYDROCODONE BITARTRATE AND ACETAMINOPHEN 7.5; 325 MG/1; MG/1
1 TABLET ORAL EVERY 6 HOURS PRN
Qty: 12 TABLET | Refills: 0 | Status: SHIPPED | OUTPATIENT
Start: 2020-11-29 | End: 2021-02-15

## 2020-11-29 RX ORDER — ONDANSETRON 4 MG/1
4 TABLET, ORALLY DISINTEGRATING ORAL EVERY 6 HOURS PRN
Qty: 10 TABLET | Refills: 0 | Status: SHIPPED | OUTPATIENT
Start: 2020-11-29 | End: 2021-02-15

## 2020-11-29 RX ORDER — ACYCLOVIR 400 MG/1
400 TABLET ORAL 3 TIMES DAILY
Qty: 30 TABLET | Refills: 0 | Status: SHIPPED | OUTPATIENT
Start: 2020-11-29 | End: 2020-12-09

## 2020-11-29 RX ADMIN — HYDROCODONE BITARTRATE AND ACETAMINOPHEN 1 TABLET: 7.5; 325 TABLET ORAL at 18:14

## 2020-11-29 RX ADMIN — ACYCLOVIR 800 MG: 800 TABLET ORAL at 18:22

## 2021-02-15 ENCOUNTER — OFFICE VISIT (OUTPATIENT)
Dept: FAMILY MEDICINE CLINIC | Facility: CLINIC | Age: 36
End: 2021-02-15

## 2021-02-15 ENCOUNTER — LAB (OUTPATIENT)
Dept: LAB | Facility: HOSPITAL | Age: 36
End: 2021-02-15

## 2021-02-15 VITALS
HEIGHT: 65 IN | DIASTOLIC BLOOD PRESSURE: 80 MMHG | WEIGHT: 161 LBS | BODY MASS INDEX: 26.82 KG/M2 | SYSTOLIC BLOOD PRESSURE: 110 MMHG | TEMPERATURE: 99 F

## 2021-02-15 DIAGNOSIS — F41.9 ANXIETY: ICD-10-CM

## 2021-02-15 DIAGNOSIS — R53.81 MALAISE AND FATIGUE: Primary | ICD-10-CM

## 2021-02-15 DIAGNOSIS — R53.83 MALAISE AND FATIGUE: Primary | ICD-10-CM

## 2021-02-15 LAB
25(OH)D3 SERPL-MCNC: 34.6 NG/ML (ref 30–100)
ALBUMIN SERPL-MCNC: 4.9 G/DL (ref 3.5–5.2)
ALBUMIN/GLOB SERPL: 1.6 G/DL
ALP SERPL-CCNC: 61 U/L (ref 39–117)
ALT SERPL W P-5'-P-CCNC: 15 U/L (ref 1–33)
ANION GAP SERPL CALCULATED.3IONS-SCNC: 11.5 MMOL/L (ref 5–15)
AST SERPL-CCNC: 19 U/L (ref 1–32)
BASOPHILS # BLD AUTO: 0.05 10*3/MM3 (ref 0–0.2)
BASOPHILS NFR BLD AUTO: 0.6 % (ref 0–1.5)
BILIRUB SERPL-MCNC: 0.3 MG/DL (ref 0–1.2)
BUN SERPL-MCNC: 7 MG/DL (ref 6–20)
BUN/CREAT SERPL: 7.7 (ref 7–25)
CALCIUM SPEC-SCNC: 10.1 MG/DL (ref 8.6–10.5)
CHLORIDE SERPL-SCNC: 102 MMOL/L (ref 98–107)
CO2 SERPL-SCNC: 24.5 MMOL/L (ref 22–29)
CREAT SERPL-MCNC: 0.91 MG/DL (ref 0.57–1)
DEPRECATED RDW RBC AUTO: 39.4 FL (ref 37–54)
EOSINOPHIL # BLD AUTO: 0.13 10*3/MM3 (ref 0–0.4)
EOSINOPHIL NFR BLD AUTO: 1.5 % (ref 0.3–6.2)
ERYTHROCYTE [DISTWIDTH] IN BLOOD BY AUTOMATED COUNT: 12.5 % (ref 12.3–15.4)
ESTRADIOL SERPL HS-MCNC: 26.3 PG/ML
GFR SERPL CREATININE-BSD FRML MDRD: 71 ML/MIN/1.73
GLOBULIN UR ELPH-MCNC: 3 GM/DL
GLUCOSE SERPL-MCNC: 83 MG/DL (ref 65–99)
HCT VFR BLD AUTO: 45.3 % (ref 34–46.6)
HGB BLD-MCNC: 15.9 G/DL (ref 12–15.9)
IMM GRANULOCYTES # BLD AUTO: 0.02 10*3/MM3 (ref 0–0.05)
IMM GRANULOCYTES NFR BLD AUTO: 0.2 % (ref 0–0.5)
IRON 24H UR-MRATE: 141 MCG/DL (ref 37–145)
LYMPHOCYTES # BLD AUTO: 3.06 10*3/MM3 (ref 0.7–3.1)
LYMPHOCYTES NFR BLD AUTO: 34.4 % (ref 19.6–45.3)
MAGNESIUM SERPL-MCNC: 2.1 MG/DL (ref 1.6–2.6)
MCH RBC QN AUTO: 30.3 PG (ref 26.6–33)
MCHC RBC AUTO-ENTMCNC: 35.1 G/DL (ref 31.5–35.7)
MCV RBC AUTO: 86.3 FL (ref 79–97)
MONOCYTES # BLD AUTO: 0.42 10*3/MM3 (ref 0.1–0.9)
MONOCYTES NFR BLD AUTO: 4.7 % (ref 5–12)
NEUTROPHILS NFR BLD AUTO: 5.22 10*3/MM3 (ref 1.7–7)
NEUTROPHILS NFR BLD AUTO: 58.6 % (ref 42.7–76)
NRBC BLD AUTO-RTO: 0.1 /100 WBC (ref 0–0.2)
PLATELET # BLD AUTO: 402 10*3/MM3 (ref 140–450)
PMV BLD AUTO: 10 FL (ref 6–12)
POTASSIUM SERPL-SCNC: 4.1 MMOL/L (ref 3.5–5.2)
PROGEST SERPL-MCNC: 0.29 NG/ML
PROT SERPL-MCNC: 7.9 G/DL (ref 6–8.5)
RBC # BLD AUTO: 5.25 10*6/MM3 (ref 3.77–5.28)
SODIUM SERPL-SCNC: 138 MMOL/L (ref 136–145)
T3 SERPL-MCNC: 122 NG/DL (ref 80–200)
T4 FREE SERPL-MCNC: 1.51 NG/DL (ref 0.93–1.7)
TSH SERPL DL<=0.05 MIU/L-ACNC: 1.29 UIU/ML (ref 0.27–4.2)
VIT B12 BLD-MCNC: 609 PG/ML (ref 211–946)
WBC # BLD AUTO: 8.9 10*3/MM3 (ref 3.4–10.8)

## 2021-02-15 PROCEDURE — 82306 VITAMIN D 25 HYDROXY: CPT | Performed by: NURSE PRACTITIONER

## 2021-02-15 PROCEDURE — 80053 COMPREHEN METABOLIC PANEL: CPT | Performed by: NURSE PRACTITIONER

## 2021-02-15 PROCEDURE — 99213 OFFICE O/P EST LOW 20 MIN: CPT | Performed by: NURSE PRACTITIONER

## 2021-02-15 PROCEDURE — 84144 ASSAY OF PROGESTERONE: CPT | Performed by: NURSE PRACTITIONER

## 2021-02-15 PROCEDURE — 85025 COMPLETE CBC W/AUTO DIFF WBC: CPT | Performed by: NURSE PRACTITIONER

## 2021-02-15 PROCEDURE — 82670 ASSAY OF TOTAL ESTRADIOL: CPT | Performed by: NURSE PRACTITIONER

## 2021-02-15 PROCEDURE — 83540 ASSAY OF IRON: CPT | Performed by: NURSE PRACTITIONER

## 2021-02-15 PROCEDURE — 84403 ASSAY OF TOTAL TESTOSTERONE: CPT | Performed by: NURSE PRACTITIONER

## 2021-02-15 PROCEDURE — 36415 COLL VENOUS BLD VENIPUNCTURE: CPT | Performed by: NURSE PRACTITIONER

## 2021-02-15 PROCEDURE — 82607 VITAMIN B-12: CPT | Performed by: NURSE PRACTITIONER

## 2021-02-15 PROCEDURE — 84402 ASSAY OF FREE TESTOSTERONE: CPT | Performed by: NURSE PRACTITIONER

## 2021-02-15 PROCEDURE — 83735 ASSAY OF MAGNESIUM: CPT | Performed by: NURSE PRACTITIONER

## 2021-02-15 PROCEDURE — 84443 ASSAY THYROID STIM HORMONE: CPT | Performed by: NURSE PRACTITIONER

## 2021-02-15 PROCEDURE — 84439 ASSAY OF FREE THYROXINE: CPT | Performed by: NURSE PRACTITIONER

## 2021-02-15 PROCEDURE — 84480 ASSAY TRIIODOTHYRONINE (T3): CPT | Performed by: NURSE PRACTITIONER

## 2021-02-15 RX ORDER — ESCITALOPRAM OXALATE 10 MG/1
10 TABLET ORAL DAILY
COMMUNITY
End: 2021-02-15

## 2021-02-15 RX ORDER — DESVENLAFAXINE SUCCINATE 50 MG/1
50 TABLET, EXTENDED RELEASE ORAL DAILY
Qty: 30 TABLET | Refills: 11 | Status: SHIPPED | OUTPATIENT
Start: 2021-02-15 | End: 2021-11-15

## 2021-02-15 NOTE — PROGRESS NOTES
Chief Complaint   Patient presents with   • Annual Exam     Subjective   Tiara Verde is a 35 y.o. female.     Fatigue  This is a recurrent problem. The current episode started in the past 7 days. The problem has been waxing and waning. Associated symptoms include fatigue and myalgias. Pertinent negatives include no abdominal pain, anorexia, arthralgias, change in bowel habit, chest pain, chills, congestion, coughing, fever, headaches, joint swelling, neck pain, swollen glands, visual change, vomiting or weakness. The treatment provided mild relief.   Anxiety  Presents for follow-up visit. Symptoms include depressed mood, excessive worry, irritability and nervous/anxious behavior. Patient reports no chest pain, compulsions, confusion, decreased concentration, dizziness, dry mouth, feeling of choking, hyperventilation, impotence, insomnia, malaise, muscle tension, obsessions, palpitations, panic, restlessness or shortness of breath.            The following portions of the patient's history were reviewed and updated as appropriate: allergies, current medications, past social history and problem list.    Review of Systems   Constitutional: Positive for fatigue and irritability. Negative for chills and fever.   HENT: Negative.  Negative for congestion.    Eyes: Negative.    Respiratory: Negative.  Negative for apnea, cough, choking, chest tightness, shortness of breath and wheezing.    Cardiovascular: Negative.  Negative for chest pain and palpitations.   Gastrointestinal: Negative.  Negative for abdominal pain, anorexia, change in bowel habit and vomiting.   Endocrine: Negative.    Genitourinary: Negative.  Negative for impotence.   Musculoskeletal: Positive for myalgias. Negative for arthralgias, joint swelling, neck pain and neck stiffness.   Skin: Negative.    Allergic/Immunologic: Negative.  Negative for environmental allergies, food allergies and immunocompromised state.   Neurological: Negative.   "Negative for dizziness, weakness and headaches.   Hematological: Negative.  Negative for adenopathy. Does not bruise/bleed easily.   Psychiatric/Behavioral: Positive for sleep disturbance. Negative for confusion and decreased concentration. The patient is nervous/anxious. The patient does not have insomnia.        Objective   /80   Temp 99 °F (37.2 °C) (Tympanic)   Ht 165.1 cm (65\")   Wt 73 kg (161 lb)   BMI 26.79 kg/m²   Physical Exam  Vitals signs and nursing note reviewed.   Constitutional:       Appearance: Normal appearance. She is normal weight.   HENT:      Head: Normocephalic and atraumatic.      Right Ear: Tympanic membrane normal.      Nose: Nose normal.      Mouth/Throat:      Mouth: Mucous membranes are dry.   Eyes:      Extraocular Movements: Extraocular movements intact.      Pupils: Pupils are equal, round, and reactive to light.   Neck:      Musculoskeletal: Normal range of motion.   Cardiovascular:      Rate and Rhythm: Normal rate and regular rhythm.      Pulses: Normal pulses.      Heart sounds: Normal heart sounds.   Pulmonary:      Effort: Pulmonary effort is normal.      Breath sounds: Normal breath sounds.   Abdominal:      General: Abdomen is flat. There is no distension.      Palpations: Abdomen is soft.      Tenderness: There is no abdominal tenderness.   Musculoskeletal: Normal range of motion.   Skin:     General: Skin is warm.   Neurological:      General: No focal deficit present.      Mental Status: She is alert and oriented to person, place, and time. Mental status is at baseline.         Assessment/Plan   Problems Addressed this Visit        Mental Health    Anxiety    Relevant Orders    CBC & Differential    Comprehensive Metabolic Panel    Iron    TSH    Vitamin B12    Vitamin D 25 Hydroxy    Magnesium    T3    T4, Free    Progesterone    Estradiol    Testosterone, F Eqlib & T LC / MS    CBC Auto Differential       Symptoms and Signs    Malaise and fatigue - Primary    " Relevant Orders    CBC & Differential    Comprehensive Metabolic Panel    Iron    TSH    Vitamin B12    Vitamin D 25 Hydroxy    Magnesium    T3    T4, Free    Progesterone    Estradiol    Testosterone, F Eqlib & T LC / MS    CBC Auto Differential      Diagnoses       Codes Comments    Malaise and fatigue    -  Primary ICD-10-CM: R53.81, R53.83  ICD-9-CM: 780.79     Anxiety     ICD-10-CM: F41.9  ICD-9-CM: 300.00            New Medications Ordered This Visit   Medications   • desvenlafaxine (Pristiq) 50 MG 24 hr tablet     Sig: Take 1 tablet by mouth Daily.     Dispense:  30 tablet     Refill:  11       It's not just what you eat, but when you eat  Eat breakfast, and eat smaller meals throughout the day. A healthy breakfast can jumpstart your metabolism, while eating small, healthy meals (rather than the standard three large meals) keeps your energy up.   Avoid eating at night. Try to eat dinner earlier and fast for 14-16 hours until breakfast the next morning. Studies suggest that eating only when you’re most active and giving your digestive system a long break each day may help to regulate weight.       Answers for HPI/ROS submitted by the patient on 2/9/2021   What is the primary reason for your visit?: Physical    Change from lexapro to pristiq -meds as directed -labs today, follow up if not feeling better, patient agrees, see back in 3 months otherwise

## 2021-02-16 ENCOUNTER — TELEPHONE (OUTPATIENT)
Dept: FAMILY MEDICINE CLINIC | Facility: CLINIC | Age: 36
End: 2021-02-16

## 2021-02-16 NOTE — TELEPHONE ENCOUNTER
Per CHRIS Suarez, Ms. Verde has been called with recent lab results & recommendations.  Continue current medications and follow-up as planned or sooner if any problems.      ----- Message from CHRIS Bloom sent at 2/16/2021  9:11 AM CST -----  Regarding: FW:  Can you let her know her labs look good. Even her hormones look good. No changes for now.  ----- Message -----  From: Lab, Background User  Sent: 2/15/2021   8:26 PM CST  To: CHRIS Bloom

## 2021-02-16 NOTE — PROGRESS NOTES
Per CHRIS Suarez, Ms. Verde has been called with recent lab results & recommendations.  Continue current medications and follow-up as planned or sooner if any problems.

## 2021-02-19 LAB
TESTOST FREE MFR SERPL: 1.91 % (ref 0.5–2.8)
TESTOST FREE SERPL-MCNC: 0.49 NG/DL (ref 0.1–0.85)
TESTOST SERPL-MCNC: 25.5 NG/DL (ref 10–55)

## 2021-09-08 PROCEDURE — U0004 COV-19 TEST NON-CDC HGH THRU: HCPCS | Performed by: NURSE PRACTITIONER

## 2021-11-15 ENCOUNTER — OFFICE VISIT (OUTPATIENT)
Dept: FAMILY MEDICINE CLINIC | Facility: CLINIC | Age: 36
End: 2021-11-15

## 2021-11-15 VITALS
SYSTOLIC BLOOD PRESSURE: 102 MMHG | BODY MASS INDEX: 26.82 KG/M2 | DIASTOLIC BLOOD PRESSURE: 80 MMHG | TEMPERATURE: 97.8 F | HEIGHT: 65 IN | WEIGHT: 161 LBS

## 2021-11-15 DIAGNOSIS — R53.83 MALAISE AND FATIGUE: ICD-10-CM

## 2021-11-15 DIAGNOSIS — F41.9 ANXIETY: Primary | ICD-10-CM

## 2021-11-15 DIAGNOSIS — R53.81 MALAISE AND FATIGUE: ICD-10-CM

## 2021-11-15 PROCEDURE — 99213 OFFICE O/P EST LOW 20 MIN: CPT | Performed by: NURSE PRACTITIONER

## 2021-11-15 RX ORDER — DESVENLAFAXINE 100 MG/1
100 TABLET, EXTENDED RELEASE ORAL DAILY
Qty: 30 TABLET | Refills: 11 | Status: SHIPPED | OUTPATIENT
Start: 2021-11-15 | End: 2022-11-16

## 2021-11-15 NOTE — PROGRESS NOTES
Chief Complaint   Patient presents with   • Struggling with emotions     doesn't feel like her meds are working as well     Subjective   Tiara Verde is a 36 y.o. female.           Fatigue  This is a recurrent problem. The current episode started more than 1 month ago. The problem has been waxing and waning. Associated symptoms include fatigue and myalgias. Pertinent negatives include no abdominal pain, anorexia, arthralgias, change in bowel habit, chest pain, chills, congestion, coughing, fever, headaches, joint swelling, neck pain, sore throat, swollen glands, urinary symptoms, vertigo, visual change, vomiting or weakness. The treatment provided mild relief.   Anxiety  Presents for follow-up visit. Symptoms include depressed mood, excessive worry, irritability and nervous/anxious behavior. Patient reports no chest pain, compulsions, confusion, decreased concentration, dizziness, dry mouth, feeling of choking, hyperventilation, impotence, insomnia, malaise, muscle tension, obsessions, palpitations, panic, restlessness or shortness of breath.            The following portions of the patient's history were reviewed and updated as appropriate: allergies, current medications, past social history and problem list.    Review of Systems   Constitutional: Positive for fatigue and irritability. Negative for chills and fever.   HENT: Negative.  Negative for congestion and sore throat.    Eyes: Negative.    Respiratory: Negative.  Negative for apnea, cough, choking, chest tightness, shortness of breath and wheezing.    Cardiovascular: Negative.  Negative for chest pain and palpitations.   Gastrointestinal: Negative.  Negative for abdominal pain, anorexia, change in bowel habit and vomiting.   Endocrine: Negative.    Genitourinary: Negative.  Negative for impotence.   Musculoskeletal: Positive for myalgias. Negative for arthralgias, joint swelling, neck pain and neck stiffness.   Skin: Negative.   "  Allergic/Immunologic: Negative.  Negative for environmental allergies, food allergies and immunocompromised state.   Neurological: Negative.  Negative for dizziness, vertigo, weakness and headaches.   Hematological: Negative.  Negative for adenopathy. Does not bruise/bleed easily.   Psychiatric/Behavioral: Positive for sleep disturbance. Negative for confusion and decreased concentration. The patient is nervous/anxious. The patient does not have insomnia.        Objective   /80   Temp 97.8 °F (36.6 °C) (Temporal)   Ht 165.1 cm (65\")   Wt 73 kg (161 lb)   BMI 26.79 kg/m²   Physical Exam  Vitals and nursing note reviewed.   Constitutional:       Appearance: Normal appearance. She is normal weight.      Comments: Tearful during exam    HENT:      Head: Normocephalic and atraumatic.      Right Ear: Tympanic membrane normal.      Nose: Nose normal. No congestion or rhinorrhea.      Mouth/Throat:      Mouth: Mucous membranes are dry.   Eyes:      Extraocular Movements: Extraocular movements intact.      Pupils: Pupils are equal, round, and reactive to light.   Cardiovascular:      Rate and Rhythm: Normal rate and regular rhythm.      Pulses: Normal pulses.      Heart sounds: Normal heart sounds.   Pulmonary:      Effort: Pulmonary effort is normal.      Breath sounds: Normal breath sounds.   Abdominal:      General: Abdomen is flat. There is no distension.      Palpations: Abdomen is soft.      Tenderness: There is no abdominal tenderness.   Musculoskeletal:         General: Normal range of motion.      Cervical back: Normal range of motion.   Skin:     General: Skin is warm.      Coloration: Skin is not jaundiced or pale.      Findings: No bruising or erythema.   Neurological:      General: No focal deficit present.      Mental Status: She is alert and oriented to person, place, and time. Mental status is at baseline.   Psychiatric:         Mood and Affect: Mood normal.         Behavior: Behavior normal. "              Assessment/Plan     Problems Addressed this Visit        Mental Health    Anxiety - Primary    Relevant Orders    Ambulatory Referral to Behavioral Health (Completed)       Symptoms and Signs    Malaise and fatigue      Diagnoses       Codes Comments    Anxiety    -  Primary ICD-10-CM: F41.9  ICD-9-CM: 300.00     Malaise and fatigue     ICD-10-CM: R53.81, R53.83  ICD-9-CM: 780.79            New Medications Ordered This Visit   Medications   • desvenlafaxine (Pristiq) 100 MG 24 hr tablet     Sig: Take 1 tablet by mouth Daily.     Dispense:  30 tablet     Refill:  11     Current Outpatient Medications on File Prior to Visit   Medication Sig Dispense Refill   • [DISCONTINUED] desvenlafaxine (Pristiq) 50 MG 24 hr tablet Take 1 tablet by mouth Daily. 30 tablet 11   • [DISCONTINUED] escitalopram (Lexapro) 10 MG tablet      • [DISCONTINUED] promethazine-dextromethorphan (PROMETHAZINE-DM) 6.25-15 MG/5ML syrup Take 5 mL by mouth Every 6 (Six) Hours As Needed for Cough. 120 mL 0     No current facility-administered medications on file prior to visit.       20 minutes  Follow Up   Return in about 4 weeks (around 12/13/2021).     It's not just what you eat, but when you eat  Eat breakfast, and eat smaller meals throughout the day. A healthy breakfast can jumpstart your metabolism, while eating small, healthy meals (rather than the standard three large meals) keeps your energy up.   Avoid eating at night. Try to eat dinner earlier and fast for 14-16 hours until breakfast the next morning. Studies suggest that eating only when you’re most active and giving your digestive system a long break each day may help to regulate weight.          Answers for HPI/ROS submitted by the patient on 11/10/2021  Please describe your symptoms.: Medicine  Have you had these symptoms before?: Yes  How long have you been having these symptoms?: Greater than 2 weeks  Please list any medications you are currently taking for this  condition.: Prestique  What is the primary reason for your visit?: Other    Increase pristiq, add vitamin 2000 daily recheck in 4 weeks , mental health referral for [facility] as directed, add back exercise and diet and follow up as directed -see back sooner if needed

## 2021-11-29 RX ORDER — METRONIDAZOLE 500 MG/1
500 TABLET ORAL 2 TIMES DAILY
Qty: 14 TABLET | Refills: 0 | Status: SHIPPED | OUTPATIENT
Start: 2021-11-29 | End: 2022-09-02

## 2022-09-02 ENCOUNTER — LAB (OUTPATIENT)
Dept: LAB | Facility: HOSPITAL | Age: 37
End: 2022-09-02

## 2022-09-02 ENCOUNTER — OFFICE VISIT (OUTPATIENT)
Dept: FAMILY MEDICINE CLINIC | Facility: CLINIC | Age: 37
End: 2022-09-02

## 2022-09-02 VITALS
DIASTOLIC BLOOD PRESSURE: 60 MMHG | BODY MASS INDEX: 26.82 KG/M2 | OXYGEN SATURATION: 98 % | WEIGHT: 161 LBS | HEART RATE: 96 BPM | SYSTOLIC BLOOD PRESSURE: 100 MMHG | HEIGHT: 65 IN

## 2022-09-02 DIAGNOSIS — R94.31 ABNORMAL EKG: ICD-10-CM

## 2022-09-02 DIAGNOSIS — F41.9 ANXIETY: ICD-10-CM

## 2022-09-02 DIAGNOSIS — R41.840 CONCENTRATION DEFICIT: Primary | ICD-10-CM

## 2022-09-02 DIAGNOSIS — R53.81 MALAISE: ICD-10-CM

## 2022-09-02 PROCEDURE — 83540 ASSAY OF IRON: CPT | Performed by: NURSE PRACTITIONER

## 2022-09-02 PROCEDURE — 85025 COMPLETE CBC W/AUTO DIFF WBC: CPT | Performed by: NURSE PRACTITIONER

## 2022-09-02 PROCEDURE — 93010 ELECTROCARDIOGRAM REPORT: CPT | Performed by: INTERNAL MEDICINE

## 2022-09-02 PROCEDURE — 93005 ELECTROCARDIOGRAM TRACING: CPT | Performed by: NURSE PRACTITIONER

## 2022-09-02 PROCEDURE — 82607 VITAMIN B-12: CPT | Performed by: NURSE PRACTITIONER

## 2022-09-02 PROCEDURE — 82306 VITAMIN D 25 HYDROXY: CPT | Performed by: NURSE PRACTITIONER

## 2022-09-02 PROCEDURE — 84443 ASSAY THYROID STIM HORMONE: CPT | Performed by: NURSE PRACTITIONER

## 2022-09-02 PROCEDURE — 36415 COLL VENOUS BLD VENIPUNCTURE: CPT | Performed by: NURSE PRACTITIONER

## 2022-09-02 PROCEDURE — 99213 OFFICE O/P EST LOW 20 MIN: CPT | Performed by: NURSE PRACTITIONER

## 2022-09-02 PROCEDURE — 80053 COMPREHEN METABOLIC PANEL: CPT | Performed by: NURSE PRACTITIONER

## 2022-09-02 RX ORDER — ATOMOXETINE 10 MG/1
10 CAPSULE ORAL DAILY
Qty: 30 CAPSULE | Refills: 11 | Status: SHIPPED | OUTPATIENT
Start: 2022-09-02

## 2022-09-02 NOTE — PROGRESS NOTES
Chief Complaint   Patient presents with   • Anxiety   • Fatigue     Check up and talk about medications     Subjective   Tiara Verde is a 37 y.o. female.           Presents with concern for anxiety -concentration issues, increase in fatigue and lack of motivation   Currently on pristiq  Has tried wellbutrin, pristiq    Anxiety  Presents for follow-up visit. Symptoms include decreased concentration, depressed mood, excessive worry, irritability and nervous/anxious behavior. Patient reports no chest pain, compulsions, confusion, dizziness, dry mouth, feeling of choking, hyperventilation, impotence, insomnia, malaise, muscle tension, obsessions, palpitations, panic, restlessness or shortness of breath.     Compliance with medications is %.   Fatigue  This is a recurrent problem. The current episode started more than 1 month ago. The problem has been waxing and waning. Associated symptoms include fatigue and myalgias. Pertinent negatives include no abdominal pain, anorexia, arthralgias, change in bowel habit, chest pain, chills, congestion, coughing, fever, headaches, joint swelling, neck pain, numbness, sore throat, swollen glands, urinary symptoms, vertigo, visual change, vomiting or weakness. The treatment provided mild relief.        The following portions of the patient's history were reviewed and updated as appropriate: allergies, current medications, past social history and problem list.    Review of Systems   Constitutional: Positive for fatigue and irritability. Negative for chills and fever.   HENT: Negative.  Negative for congestion, sinus pressure, sinus pain, sneezing and sore throat.    Eyes: Negative.  Negative for photophobia, redness and visual disturbance.   Respiratory: Negative.  Negative for apnea, cough, choking, chest tightness, shortness of breath and wheezing.    Cardiovascular: Negative.  Negative for chest pain, palpitations and leg swelling.   Gastrointestinal: Negative.   "Negative for abdominal distention, abdominal pain, anal bleeding, anorexia, blood in stool, change in bowel habit and vomiting.   Endocrine: Negative.  Negative for polydipsia, polyphagia and polyuria.   Genitourinary: Negative.  Negative for impotence.   Musculoskeletal: Positive for myalgias. Negative for arthralgias, back pain, gait problem, joint swelling, neck pain and neck stiffness.   Skin: Negative.    Allergic/Immunologic: Negative.  Negative for environmental allergies, food allergies and immunocompromised state.   Neurological: Negative.  Negative for dizziness, vertigo, tremors, syncope, speech difficulty, weakness, light-headedness, numbness and headaches.   Hematological: Negative.  Negative for adenopathy. Does not bruise/bleed easily.   Psychiatric/Behavioral: Positive for decreased concentration and sleep disturbance. Negative for agitation, behavioral problems, confusion, dysphoric mood and hallucinations. The patient is nervous/anxious. The patient does not have insomnia and is not hyperactive.        Objective   /60   Pulse 96   Ht 165.1 cm (65\")   Wt 73 kg (161 lb)   SpO2 98%   BMI 26.79 kg/m²   Physical Exam  Vitals and nursing note reviewed.   Constitutional:       Appearance: Normal appearance. She is normal weight.   HENT:      Head: Normocephalic and atraumatic.      Right Ear: Tympanic membrane normal.      Nose: Nose normal. No congestion or rhinorrhea.      Mouth/Throat:      Mouth: Mucous membranes are dry.   Eyes:      Extraocular Movements: Extraocular movements intact.      Pupils: Pupils are equal, round, and reactive to light.   Cardiovascular:      Rate and Rhythm: Normal rate and regular rhythm.      Pulses: Normal pulses.      Heart sounds: Normal heart sounds. No murmur heard.    No friction rub. No gallop.   Pulmonary:      Effort: Pulmonary effort is normal. No respiratory distress.      Breath sounds: Normal breath sounds. No stridor. No wheezing, rhonchi or " rales.   Chest:      Chest wall: No tenderness.   Abdominal:      General: Abdomen is flat. There is no distension.      Palpations: Abdomen is soft. There is no mass.      Tenderness: There is no abdominal tenderness. There is no guarding or rebound.      Hernia: No hernia is present.   Musculoskeletal:         General: Normal range of motion.      Cervical back: Normal range of motion.   Skin:     General: Skin is warm.      Coloration: Skin is not jaundiced or pale.      Findings: No bruising or erythema.   Neurological:      General: No focal deficit present.      Mental Status: She is alert and oriented to person, place, and time. Mental status is at baseline.      Cranial Nerves: No cranial nerve deficit.      Sensory: No sensory deficit.      Motor: No weakness.      Coordination: Coordination normal.      Gait: Gait normal.      Deep Tendon Reflexes: Reflexes normal.   Psychiatric:         Mood and Affect: Mood normal.         Behavior: Behavior normal.               Assessment & Plan     Problems Addressed this Visit        Mental Health    Anxiety      Other Visit Diagnoses     Concentration deficit    -  Primary    Relevant Orders    Ambulatory Referral to Behavioral Health    ECG 12 Lead (Completed)    CBC & Differential    Comprehensive Metabolic Panel    Iron    TSH    Vitamin B12    Vitamin D 25 Hydroxy    Malaise        Abnormal EKG        Relevant Orders    Ambulatory Referral to Cardiology      Diagnoses       Codes Comments    Concentration deficit    -  Primary ICD-10-CM: R41.840  ICD-9-CM: 799.51     Anxiety     ICD-10-CM: F41.9  ICD-9-CM: 300.00     Malaise     ICD-10-CM: R53.81  ICD-9-CM: 780.79     Abnormal EKG     ICD-10-CM: R94.31  ICD-9-CM: 794.31            New Medications Ordered This Visit   Medications   • atomoxetine (Strattera) 10 MG capsule     Sig: Take 1 capsule by mouth Daily.     Dispense:  30 capsule     Refill:  11     Current Outpatient Medications on File Prior to Visit    Medication Sig Dispense Refill   • desvenlafaxine (Pristiq) 100 MG 24 hr tablet Take 1 tablet by mouth Daily. 30 tablet 11   • [DISCONTINUED] metroNIDAZOLE (Flagyl) 500 MG tablet Take 1 tablet by mouth 2 (Two) Times a Day. 14 tablet 0     No current facility-administered medications on file prior to visit.       15 minutes   Follow Up   No follow-ups on file.        It's not just what you eat, but when you eat  Eat breakfast, and eat smaller meals throughout the day. A healthy breakfast can jumpstart your metabolism, while eating small, healthy meals (rather than the standard three large meals) keeps your energy up.   Avoid eating at night. Try to eat dinner earlier and fast for 14-16 hours until breakfast the next morning. Studies suggest that eating only when you’re most active and giving your digestive system a long break each day may help to regulate weight.       Answers for HPI/ROS submitted by the patient on 8/31/2022  Please describe your symptoms.: No difference seen with medicine  Have you had these symptoms before?: Yes  How long have you been having these symptoms?: Greater than 2 weeks  Please list any medications you are currently taking for this condition.: Prestique 100mg daily  What is the primary reason for your visit?: Other    Labs and ekg today -abnormal ekg-no complaints of cp   Labs today, referral for mental health as directed  Diet discussed -concern for add-start strattera for now and see how she responds

## 2022-09-03 LAB
25(OH)D3 SERPL-MCNC: 45.5 NG/ML (ref 30–100)
ALBUMIN SERPL-MCNC: 4.5 G/DL (ref 3.5–5.2)
ALBUMIN/GLOB SERPL: 1.8 G/DL
ALP SERPL-CCNC: 67 U/L (ref 39–117)
ALT SERPL W P-5'-P-CCNC: 10 U/L (ref 1–33)
ANION GAP SERPL CALCULATED.3IONS-SCNC: 13 MMOL/L (ref 5–15)
AST SERPL-CCNC: 17 U/L (ref 1–32)
BASOPHILS # BLD AUTO: 0.04 10*3/MM3 (ref 0–0.2)
BASOPHILS NFR BLD AUTO: 0.6 % (ref 0–1.5)
BILIRUB SERPL-MCNC: <0.2 MG/DL (ref 0–1.2)
BUN SERPL-MCNC: 6 MG/DL (ref 6–20)
BUN/CREAT SERPL: 8.7 (ref 7–25)
CALCIUM SPEC-SCNC: 9.3 MG/DL (ref 8.6–10.5)
CHLORIDE SERPL-SCNC: 102 MMOL/L (ref 98–107)
CO2 SERPL-SCNC: 24 MMOL/L (ref 22–29)
CREAT SERPL-MCNC: 0.69 MG/DL (ref 0.57–1)
DEPRECATED RDW RBC AUTO: 39.8 FL (ref 37–54)
EGFRCR SERPLBLD CKD-EPI 2021: 116.2 ML/MIN/1.73
EOSINOPHIL # BLD AUTO: 0.04 10*3/MM3 (ref 0–0.4)
EOSINOPHIL NFR BLD AUTO: 0.6 % (ref 0.3–6.2)
ERYTHROCYTE [DISTWIDTH] IN BLOOD BY AUTOMATED COUNT: 12.9 % (ref 12.3–15.4)
GLOBULIN UR ELPH-MCNC: 2.5 GM/DL
GLUCOSE SERPL-MCNC: 98 MG/DL (ref 65–99)
HCT VFR BLD AUTO: 42.6 % (ref 34–46.6)
HGB BLD-MCNC: 15.1 G/DL (ref 12–15.9)
IMM GRANULOCYTES # BLD AUTO: 0.03 10*3/MM3 (ref 0–0.05)
IMM GRANULOCYTES NFR BLD AUTO: 0.4 % (ref 0–0.5)
IRON 24H UR-MRATE: 40 MCG/DL (ref 37–145)
LYMPHOCYTES # BLD AUTO: 2.64 10*3/MM3 (ref 0.7–3.1)
LYMPHOCYTES NFR BLD AUTO: 37 % (ref 19.6–45.3)
MCH RBC QN AUTO: 30.6 PG (ref 26.6–33)
MCHC RBC AUTO-ENTMCNC: 35.4 G/DL (ref 31.5–35.7)
MCV RBC AUTO: 86.2 FL (ref 79–97)
MONOCYTES # BLD AUTO: 0.47 10*3/MM3 (ref 0.1–0.9)
MONOCYTES NFR BLD AUTO: 6.6 % (ref 5–12)
NEUTROPHILS NFR BLD AUTO: 3.91 10*3/MM3 (ref 1.7–7)
NEUTROPHILS NFR BLD AUTO: 54.8 % (ref 42.7–76)
NRBC BLD AUTO-RTO: 0 /100 WBC (ref 0–0.2)
PLATELET # BLD AUTO: 356 10*3/MM3 (ref 140–450)
PMV BLD AUTO: 10.3 FL (ref 6–12)
POTASSIUM SERPL-SCNC: 4.2 MMOL/L (ref 3.5–5.2)
PROT SERPL-MCNC: 7 G/DL (ref 6–8.5)
RBC # BLD AUTO: 4.94 10*6/MM3 (ref 3.77–5.28)
SODIUM SERPL-SCNC: 139 MMOL/L (ref 136–145)
TSH SERPL DL<=0.05 MIU/L-ACNC: 1.14 UIU/ML (ref 0.27–4.2)
VIT B12 BLD-MCNC: 619 PG/ML (ref 211–946)
WBC NRBC COR # BLD: 7.13 10*3/MM3 (ref 3.4–10.8)

## 2022-09-06 ENCOUNTER — TELEPHONE (OUTPATIENT)
Dept: FAMILY MEDICINE CLINIC | Facility: CLINIC | Age: 37
End: 2022-09-06

## 2022-09-06 NOTE — TELEPHONE ENCOUNTER
Per CHRIS Suarez, Ms. Verde has been called with recent lab results & recommendations.  Continue current medications and follow-up as planned or sooner if any problems.       ----- Message from CHRIS Bloom sent at 9/6/2022 10:38 AM CDT -----  Regarding: FW:  Can you let her know labs are good. No changes needed  ----- Message -----  From: Lab, Background User  Sent: 9/3/2022   8:57 AM CDT  To: CHRIS Bloom

## 2022-09-17 LAB
QT INTERVAL: 382 MS
QTC INTERVAL: 448 MS

## 2022-11-16 RX ORDER — DESVENLAFAXINE 100 MG/1
TABLET, EXTENDED RELEASE ORAL
Qty: 30 TABLET | Refills: 5 | Status: SHIPPED | OUTPATIENT
Start: 2022-11-16

## 2023-01-06 ENCOUNTER — OFFICE VISIT (OUTPATIENT)
Dept: BEHAVIORAL HEALTH | Facility: CLINIC | Age: 38
End: 2023-01-06
Payer: COMMERCIAL

## 2023-01-06 DIAGNOSIS — F33.1 MODERATE EPISODE OF RECURRENT MAJOR DEPRESSIVE DISORDER: Primary | ICD-10-CM

## 2023-01-06 PROCEDURE — 90791 PSYCH DIAGNOSTIC EVALUATION: CPT | Performed by: PSYCHOLOGIST

## 2023-02-03 ENCOUNTER — OFFICE VISIT (OUTPATIENT)
Dept: BEHAVIORAL HEALTH | Facility: CLINIC | Age: 38
End: 2023-02-03
Payer: COMMERCIAL

## 2023-02-03 DIAGNOSIS — F33.2 MAJOR DEPRESSIVE DISORDER, RECURRENT EPISODE, SEVERE WITH ANXIOUS DISTRESS: ICD-10-CM

## 2023-02-03 DIAGNOSIS — F90.2 ATTENTION DEFICIT HYPERACTIVITY DISORDER, COMBINED TYPE: ICD-10-CM

## 2023-02-03 PROCEDURE — 96130 PSYCL TST EVAL PHYS/QHP 1ST: CPT | Performed by: PSYCHOLOGIST

## 2023-02-06 NOTE — PROGRESS NOTES
"2023    Tiara Verde, a 37 y.o. female, was seen today for initial appointment lasting 45 minutes.  4-4:45 pm CST  Patient is referred by Daniela Jefferson APRN for an assessment related to ADHD and MDD.    She is 5'5\" and weighs 165 pounds.      SUBJECTIVE:  She is experiencing: sadness, low tolerance to stress, amotivation, anhedonia, poor coping skills, social isolation, fatigue, grief, worry, passive personality traits, inattention, hyperactivity, academic underachievement, restlessness, fidgety, impulsivity, talkativeness, racing thoughts, mood swings, easily distracted, poor organizational skills, interrupts others, quick temper, irritability, forgetfulness, and low tolerance to stress.    The symptoms of MDD have been present since  when her paternal grandmother .    The symptoms of ADHD have been present since childhood, but have worsened over the past 12 months.     FAMILY HISTORY:  ADHD- none  MDD- mother, paternal grandmother, half-sister, maternal aunt x 4  Anxiety- half-sister, mother, paternal aunt, maternal aunt x 4  Alcohol- maternal grandmother, maternal uncle x2   Drug- none    The patient met all developmental milestones on time.    Her parents  in .  The relationship produced one child.  They  in .      Her father never remarried. He was in a dating relationship which produced a son ('01).     Her mother remarried in .  The relationship produced one daughter ('94).  Her mother had a sexual affair.  They  in .     She  in .  The relationship produced 2 children ('08 daughter, '10 son). They  in . She lives with her daughter and son.     She graduated from [employer] in .  She has completed 48 credit hours at [employer].     She works for [employer] (- present).     MENTAL STATUS:  The patient was appropriately dressed and groomed.  The patient’s speech was WNL (rate, volume, articulation, " coherence, etc.).  The patient was oriented to time, place, and person.  The patient’s memory (remote and recent) was intact.  The patient’s attention and concentration were WNL.  The patient’s mood and affect were congruent.    The patient’s thought content did not appear to possess delusions or hallucinations. These results do not appear to be significantly influenced by the effects of visual, auditory, or motor deficits, environmental/economic or cultural differences.  The patient denied SI/HI.        strengths: the patient is polite and courteous  weakness: the patient is unable to manage symptoms   short term goal: the patient will reduce symptoms from 11 x day to 1 x week  long term goal: the patient will eliminate the above-mentioned symptoms    DIAGNOSIS:    ICD-10-CM ICD-9-CM   1. Moderate episode of recurrent major depressive disorder (HCC)  F33.1 296.32       ASSESSMENT PLAN:  She will complete the psychological assessment.  Copied text within this note has been reviewed and is accurate as of 02/06/23            This document has been electronically signed by Rohan Babb, PhD on February 6, 2023 08:19 CST

## 2023-02-23 ENCOUNTER — HOSPITAL ENCOUNTER (EMERGENCY)
Facility: HOSPITAL | Age: 38
Discharge: HOME | End: 2023-02-23
Attending: STUDENT IN AN ORGANIZED HEALTH CARE EDUCATION/TRAINING PROGRAM | Admitting: STUDENT IN AN ORGANIZED HEALTH CARE EDUCATION/TRAINING PROGRAM
Payer: COMMERCIAL

## 2023-02-23 VITALS
DIASTOLIC BLOOD PRESSURE: 71 MMHG | OXYGEN SATURATION: 97 % | TEMPERATURE: 98 F | BODY MASS INDEX: 26.66 KG/M2 | HEART RATE: 84 BPM | RESPIRATION RATE: 18 BRPM | SYSTOLIC BLOOD PRESSURE: 108 MMHG | HEIGHT: 65 IN | WEIGHT: 160 LBS

## 2023-02-23 DIAGNOSIS — R00.2 PALPITATIONS: Primary | ICD-10-CM

## 2023-02-23 LAB
ALBUMIN SERPL-MCNC: 4.2 G/DL (ref 3.5–5.2)
ALBUMIN/GLOB SERPL: 1.4 G/DL
ALP SERPL-CCNC: 62 U/L (ref 39–117)
ALT SERPL W P-5'-P-CCNC: 10 U/L (ref 1–33)
ANION GAP SERPL CALCULATED.3IONS-SCNC: 9 MMOL/L (ref 5–15)
AST SERPL-CCNC: 16 U/L (ref 1–32)
BASOPHILS # BLD AUTO: 0.03 10*3/MM3 (ref 0–0.2)
BASOPHILS NFR BLD AUTO: 0.4 % (ref 0–1.5)
BILIRUB SERPL-MCNC: 0.2 MG/DL (ref 0–1.2)
BUN SERPL-MCNC: 11 MG/DL (ref 6–20)
BUN/CREAT SERPL: 13.8 (ref 7–25)
CALCIUM SPEC-SCNC: 9.2 MG/DL (ref 8.6–10.5)
CHLORIDE SERPL-SCNC: 104 MMOL/L (ref 98–107)
CO2 SERPL-SCNC: 27 MMOL/L (ref 22–29)
CREAT SERPL-MCNC: 0.8 MG/DL (ref 0.57–1)
DEPRECATED RDW RBC AUTO: 37.5 FL (ref 37–54)
EGFRCR SERPLBLD CKD-EPI 2021: 98.7 ML/MIN/1.73
EOSINOPHIL # BLD AUTO: 0.04 10*3/MM3 (ref 0–0.4)
EOSINOPHIL NFR BLD AUTO: 0.5 % (ref 0.3–6.2)
ERYTHROCYTE [DISTWIDTH] IN BLOOD BY AUTOMATED COUNT: 12.4 % (ref 12.3–15.4)
GLOBULIN UR ELPH-MCNC: 2.9 GM/DL
GLUCOSE SERPL-MCNC: 114 MG/DL (ref 65–99)
HCT VFR BLD AUTO: 39.9 % (ref 34–46.6)
HGB BLD-MCNC: 13.7 G/DL (ref 12–15.9)
HOLD SPECIMEN: NORMAL
IMM GRANULOCYTES # BLD AUTO: 0.01 10*3/MM3 (ref 0–0.05)
IMM GRANULOCYTES NFR BLD AUTO: 0.1 % (ref 0–0.5)
LYMPHOCYTES # BLD AUTO: 2.55 10*3/MM3 (ref 0.7–3.1)
LYMPHOCYTES NFR BLD AUTO: 32 % (ref 19.6–45.3)
MAGNESIUM SERPL-MCNC: 1.9 MG/DL (ref 1.6–2.6)
MCH RBC QN AUTO: 29.2 PG (ref 26.6–33)
MCHC RBC AUTO-ENTMCNC: 34.3 G/DL (ref 31.5–35.7)
MCV RBC AUTO: 85.1 FL (ref 79–97)
MONOCYTES # BLD AUTO: 0.41 10*3/MM3 (ref 0.1–0.9)
MONOCYTES NFR BLD AUTO: 5.2 % (ref 5–12)
NEUTROPHILS NFR BLD AUTO: 4.92 10*3/MM3 (ref 1.7–7)
NEUTROPHILS NFR BLD AUTO: 61.8 % (ref 42.7–76)
NRBC BLD AUTO-RTO: 0 /100 WBC (ref 0–0.2)
PLATELET # BLD AUTO: 380 10*3/MM3 (ref 140–450)
PMV BLD AUTO: 8.8 FL (ref 6–12)
POTASSIUM SERPL-SCNC: 3.7 MMOL/L (ref 3.5–5.2)
PROT SERPL-MCNC: 7.1 G/DL (ref 6–8.5)
RBC # BLD AUTO: 4.69 10*6/MM3 (ref 3.77–5.28)
SODIUM SERPL-SCNC: 140 MMOL/L (ref 136–145)
TROPONIN T SERPL HS-MCNC: <6 NG/L
WBC NRBC COR # BLD: 7.96 10*3/MM3 (ref 3.4–10.8)
WHOLE BLOOD HOLD COAG: NORMAL

## 2023-02-23 PROCEDURE — 93010 ELECTROCARDIOGRAM REPORT: CPT | Performed by: INTERNAL MEDICINE

## 2023-02-23 PROCEDURE — 85025 COMPLETE CBC W/AUTO DIFF WBC: CPT

## 2023-02-23 PROCEDURE — 99283 EMERGENCY DEPT VISIT LOW MDM: CPT

## 2023-02-23 PROCEDURE — 93005 ELECTROCARDIOGRAM TRACING: CPT

## 2023-02-23 PROCEDURE — 83735 ASSAY OF MAGNESIUM: CPT

## 2023-02-23 PROCEDURE — 84484 ASSAY OF TROPONIN QUANT: CPT

## 2023-02-23 PROCEDURE — 80053 COMPREHEN METABOLIC PANEL: CPT

## 2023-03-02 LAB
QT INTERVAL: 364 MS
QTC INTERVAL: 464 MS

## 2023-03-02 NOTE — PROGRESS NOTES
[facility]  [address] [place] [id number]  PHONE : [phone]  FAX: [phone]      DATE:  2023    PATIENT:   Tiara Verde 1985                                 MEDICAL RECORD #:  [id number]  Chronological age: 37 y.o.   Date of Psychological Assessment:   Examiner: Rohan Babb, PhD   Licensed Psychologist      Tests Administered:  Dayna Brief Intelligence Test- Second Edition (KBIT-2)  Wide Range Achievement Test- Fifth Edition (WRAT-5)  Brown ADD Scales (Brown ADD)  Simental Depression Inventory (BDI-2)  Simental Anxiety inventory (OLINDA)  Shimon’s Incomplete Sentence Blank- Adult (RISB-A)  Clinical Interview and Review of Records    Identification and Referral Information:   Ms. Verde was referred by CHRIS Martinez for an assessment related to ADHD and MDD.     Presenting Problem and Background Information:  Ms. Verde is experiencing: sadness, low tolerance to stress, amotivation, anhedonia, poor coping skills, social isolation, fatigue, grief, worry, passive personality traits, inattention, hyperactivity, academic underachievement, restlessness, fidgety, impulsivity, talkativeness, racing thoughts, mood swings, easily distracted, poor organizational skills, interrupts others, quick temper, irritability, forgetfulness, and low tolerance to stress.     The symptoms of MDD have been present since  when her paternal grandmother .     The symptoms of ADHD have been present since childhood, but have worsened over the past 12 months.    Behavioral Observations:  Tiara was alert and oriented to time, place, and person. Her thought content did not appear to possess delusions or hallucinations. These results do not appear to be significantly influenced by the effects of visual, auditory, or motor deficits, environmental/economic or cultural differences. The following results are thought to be valid.      Test  Results:  The interpretive information in this report should be viewed as only one source of hypotheses and no decision should be based solely on this information. This data should be integrated with all other sources of information in reaching professional decisions about the individual. This report is confidential and intended for use by qualified professionals only.    KBIT-2  The KBIT-2 is a brief, individually administered measure of verbal and nonverbal intelligence for children, adolescents, and adults, spanning the ages from 4 to 90 years.    Ms. Verde obtained an IQ Composite Standard Score of 113 which yielded a Percentile of 81 and places her in the Average range of intellectual functioning. A Percentile of 81 means that she scored as well as or better than 81 out of 100 peers in the sample population. At a 90% Confidence Interval her true IQ Score falls between 106 and 119.  Individuals with similar scores learn material at a similar rate compared to same age peers and require a similar degree of examples, repetition and guided practice as same-aged peers.    The 9 point difference between the Verbal Standard Score (106, Average, 66%ile 18.6 years age equivalent) and the Nonverbal Standard Score (115, Average, 84%ile, 18.6 years age equivalent) was not significant and suggests that her verbal reasoning abilities are equally developed compared to her nonverbal reasoning abilities.    WRAT-5   The WRAT-5 is a screening measure of academic achievement. Ms. Verde graduated from [employer] in 2005.  She has completed 48 credit hours at [employer]. She works for [employer] (2021- present).             The results of the WRAT-5 indicate she is performing at a Grade Score of 11.6 standard score of 92 and a percentile rank of 30. On the Spelling Subtest, the examinee obtained a Standard Score of 109 (73%ile) and a Grade Score of >12.9. On the Math Computation Subtest, the examinee obtained a Standard  Score of 120 (91%ile) and a Grade Score of >12.9. On the Sentence Comprehension, the examinee obtained a Standard Score of 104 (61%ile) and a Grade Score of >12.9. On the Reading Composite the examinee obtained a Standard Score of 106 (66%ile).        The scores on the WRAT-5 are commensurate with her cognitive ability.     Brown ADD Scales  The Brown ADD Scales help to assess a wide range of symptoms of executive function impairments associated with ADHD/ADD.  The Brown ADD Scales include 40 items that assess five clusters of ADHD-related executive function impairments.      Ms. Verde, her coworker (Jennyfer Herrera) and her friend (Jhoan Up) completed the rating scales.  T-Scores 60 and above are considered clinically significant.  She obtained the following T-scores:      Activation Attention Effort Affect Memory Total Score   Self 80 84 56 77 75 79   Coworker  70 69 50 80 62 68   Friend    75 72 54 73 75 73     The results of the Brown ADD Scales indicate symptoms of ADHD.  However, no data exists to establish the onset of symptoms.  A provisional diagnosis is warranted.     BDI-2  The BDI-2 is a 21 item, self-report instrument for measuring the severity of depression in adults and adolescents aged 13 years or older. The BDI-2 was developed for the assessment of symptoms corresponding to criteria for diagnosing depressive disorders listed in the American Psychiatric Association's Diagnostic and Statistical Manual of Mental Disorders (DSM-IV-TR). Scores above 28 are considered “severe”, 20-28 are “moderate”, 14-19 are “mild”, and 0-13 are “minimal”.        Ms. Verde obtained a score of 51 on the BDI-2. This score falls in the “severe” range of depressive symptoms.  She is endorsing clinically significant symptoms of depression.    OLINDA  The Simental Anxiety Inventory (OLINDA) is a widely used 21-item self-report inventory used to assess anxiety levels in adults and adolescents. It has been used in multiple studies,  "including in treatment-outcome studies for individuals who have experienced traumas. The age range for the measure is from 17 to 80 years.        The OLINDA discriminates between anxious and non-anxious groups.  The inventory contains 21 items rated from 0 to 3 by the taker, with a total possible score of 63 points. The items are experiences related to anxiety such as \"Fear of worst happening\" or \"Heart pounding/racing\".  Total scores 0-7 = minimal, 8-15 = mild, 16-25 = moderate, 26+ = severe.  Scores of 26 and above indicate statistically significant levels of anxiety.    Ms. Verde obtained a score of 17 on the OLINDA.  This score falls in the “moderate” level of anxiety symptoms. She is endorsing clinically significant symptoms of anxiety.     RISB-A  Shimon’s Incomplete Sentence Blank- Adult is a 40 item fill-in-the blank project test designed to gather psychological data in the assessment process.  The results of the RISB-A indicate insomnia, feelings of regret, fear of being alone, racing thoughts, and low tolerance to stress.      Diagnosis  Problems Addressed this Visit    None  Visit Diagnoses     Attention deficit hyperactivity disorder, combined type        Major depressive disorder, recurrent episode, severe with anxious distress (HCC)          Diagnoses       Codes Comments    Attention deficit hyperactivity disorder, combined type     ICD-10-CM: F90.2  ICD-9-CM: 314.01 Provisional     Major depressive disorder, recurrent episode, severe with anxious distress (HCC)     ICD-10-CM: F33.2  ICD-9-CM: 296.33         Summary:   Ms. Verde was referred by CHRIS Martinez for an assessment related to ADHD and MDD.     The results of the KBIT-2 indicate she is performing in the Average range of cognitive ability (113 IQ Composite).The results of the WRAT-5 indicate the following grade scores: 11.6 in Word Reading, >12.9 in Spelling, >12.9 in Math Computation, and >12.9 in Sentence Comprehension.  The results " of the Brown ADD Scales indicate symptoms of ADHD. The results of the BDI-2 indicate depression. The results of the OLINDA indicate anxiety. The results of the RISB-A indicate insomnia, feelings of regret, fear of being alone, racing thoughts, and low tolerance to stress.    Recommendations:  It is the recommendation of the undersigned that Tiara Verde receive:   1. ongoing counseling as necessary to address ADHD, MDD, and PAULETTE  2. psychiatric treatment as needed  3. educational and vocational assistance as necessary     I spent 60 minutes in direct face to face contact with patient.  Greater than 50% of this time was spent counseling patient and discussing plan of care.  Copied text within this note has been reviewed and is accurate as of 03/02/23          This document has been electronically signed by Rohan Babb, PhD on March 2, 2023 09:01 CST        Rohan Babb, PhD   Licensed Psychologist

## 2023-05-15 RX ORDER — DESVENLAFAXINE 100 MG/1
TABLET, EXTENDED RELEASE ORAL
Qty: 30 TABLET | Refills: 5 | Status: SHIPPED | OUTPATIENT
Start: 2023-05-15

## (undated) DEVICE — PK KN ARTHSCP LF 60

## (undated) DEVICE — GLV SURG SENSICARE ALOE LF PF SZ7.5 GRN

## (undated) DEVICE — TOWEL,OR,DSP,ST,BLUE,DLX,8/PK,10PK/CS: Brand: MEDLINE

## (undated) DEVICE — TP SXN YANKR BLB TIP W/TBG 10F LF STRL

## (undated) DEVICE — SUT ETHLN 3-0 FS118IN 663H

## (undated) DEVICE — BNDG ELAS ELITE V/CLOSE 6IN 5YD LF STRL

## (undated) DEVICE — GLV SURG ORTHO BIOGEL OPTIFIT PF SZ9

## (undated) DEVICE — GOWN,PREVENTION PLUS,XLONG/XLARGE,STRL: Brand: MEDLINE

## (undated) DEVICE — DRSNG GZ CURAD XEROFORM NONADHS 5X9IN STRL

## (undated) DEVICE — GLV SURG SENSICARE GREEN W/ALOE PF LF 7 STRL

## (undated) DEVICE — BLD SHAVER EXCALIBUR 4MM 13CM

## (undated) DEVICE — TBG PUMP ARTHSCP MAIN AR6400 16FT

## (undated) DEVICE — DRAPE,U/ SHT,SPLIT,PLAS,STERIL: Brand: MEDLINE

## (undated) DEVICE — NDL SPINE 18G 31/2IN PNK

## (undated) DEVICE — GLV SURG SENSICARE GREEN W/ALOE PF LF 6.5 STRL

## (undated) DEVICE — DISPOSABLE TOURNIQUET CUFF SINGLE BLADDER, DUAL PORT AND QUICK CONNECT CONNECTOR: Brand: COLOR CUFF

## (undated) DEVICE — GLV SURG TRIUMPH LT PF LTX 7.5 STRL

## (undated) DEVICE — GLV SURG TRIUMPH LT PF LTX 6 STRL

## (undated) DEVICE — SOL IRR LACT RNG BG 3000ML

## (undated) DEVICE — SPNG GZ WOVN 4X4IN 12PLY 10/BX STRL

## (undated) DEVICE — APPL CHLORAPREP W/TINT 26ML ORNG